# Patient Record
Sex: MALE | URBAN - NONMETROPOLITAN AREA
[De-identification: names, ages, dates, MRNs, and addresses within clinical notes are randomized per-mention and may not be internally consistent; named-entity substitution may affect disease eponyms.]

---

## 2019-03-14 ENCOUNTER — APPOINTMENT (RX ONLY)
Dept: URBAN - NONMETROPOLITAN AREA CLINIC 12 | Facility: CLINIC | Age: 39
Setting detail: DERMATOLOGY
End: 2019-03-14

## 2020-12-14 ENCOUNTER — PRE-ADMISSION TESTING (OUTPATIENT)
Dept: ADMISSIONS | Facility: MEDICAL CENTER | Age: 40
End: 2020-12-14
Attending: SURGERY
Payer: COMMERCIAL

## 2020-12-14 DIAGNOSIS — Z01.812 PRE-OPERATIVE LABORATORY EXAMINATION: ICD-10-CM

## 2020-12-14 LAB
ANION GAP SERPL CALC-SCNC: 10 MMOL/L (ref 7–16)
BUN SERPL-MCNC: 11 MG/DL (ref 8–22)
CALCIUM SERPL-MCNC: 10.4 MG/DL (ref 8.5–10.5)
CHLORIDE SERPL-SCNC: 100 MMOL/L (ref 96–112)
CO2 SERPL-SCNC: 31 MMOL/L (ref 20–33)
CREAT SERPL-MCNC: 0.93 MG/DL (ref 0.5–1.4)
ERYTHROCYTE [DISTWIDTH] IN BLOOD BY AUTOMATED COUNT: 46.8 FL (ref 35.9–50)
GLUCOSE SERPL-MCNC: 95 MG/DL (ref 65–99)
HCT VFR BLD AUTO: 49.8 % (ref 42–52)
HGB BLD-MCNC: 15.9 G/DL (ref 14–18)
MCH RBC QN AUTO: 29.2 PG (ref 27–33)
MCHC RBC AUTO-ENTMCNC: 31.9 G/DL (ref 33.7–35.3)
MCV RBC AUTO: 91.5 FL (ref 81.4–97.8)
PLATELET # BLD AUTO: 296 K/UL (ref 164–446)
PMV BLD AUTO: 9.8 FL (ref 9–12.9)
POTASSIUM SERPL-SCNC: 4.2 MMOL/L (ref 3.6–5.5)
RBC # BLD AUTO: 5.44 M/UL (ref 4.7–6.1)
SODIUM SERPL-SCNC: 141 MMOL/L (ref 135–145)
WBC # BLD AUTO: 5.6 K/UL (ref 4.8–10.8)

## 2020-12-14 PROCEDURE — 85027 COMPLETE CBC AUTOMATED: CPT

## 2020-12-14 PROCEDURE — 36415 COLL VENOUS BLD VENIPUNCTURE: CPT

## 2020-12-14 PROCEDURE — 80048 BASIC METABOLIC PNL TOTAL CA: CPT

## 2020-12-15 ENCOUNTER — ANESTHESIA (OUTPATIENT)
Dept: SURGERY | Facility: MEDICAL CENTER | Age: 40
End: 2020-12-15
Payer: COMMERCIAL

## 2020-12-15 ENCOUNTER — ANESTHESIA EVENT (OUTPATIENT)
Dept: SURGERY | Facility: MEDICAL CENTER | Age: 40
End: 2020-12-15
Payer: COMMERCIAL

## 2020-12-15 ENCOUNTER — HOSPITAL ENCOUNTER (OUTPATIENT)
Facility: MEDICAL CENTER | Age: 40
End: 2020-12-16
Attending: SURGERY | Admitting: SURGERY
Payer: COMMERCIAL

## 2020-12-15 DIAGNOSIS — G89.18 POSTOPERATIVE PAIN: ICD-10-CM

## 2020-12-15 PROCEDURE — 700105 HCHG RX REV CODE 258: Performed by: SURGERY

## 2020-12-15 PROCEDURE — 160041 HCHG SURGERY MINUTES - EA ADDL 1 MIN LEVEL 4: Performed by: SURGERY

## 2020-12-15 PROCEDURE — 700111 HCHG RX REV CODE 636 W/ 250 OVERRIDE (IP): Performed by: ANESTHESIOLOGY

## 2020-12-15 PROCEDURE — 501577 HCHG TROCAR, STEP 11MM: Performed by: SURGERY

## 2020-12-15 PROCEDURE — 96374 THER/PROPH/DIAG INJ IV PUSH: CPT

## 2020-12-15 PROCEDURE — C1781 MESH (IMPLANTABLE): HCPCS | Performed by: SURGERY

## 2020-12-15 PROCEDURE — 160002 HCHG RECOVERY MINUTES (STAT): Performed by: SURGERY

## 2020-12-15 PROCEDURE — 700101 HCHG RX REV CODE 250: Performed by: SURGERY

## 2020-12-15 PROCEDURE — G0378 HOSPITAL OBSERVATION PER HR: HCPCS

## 2020-12-15 PROCEDURE — 501570 HCHG TROCAR, SEPARATOR: Performed by: SURGERY

## 2020-12-15 PROCEDURE — 160029 HCHG SURGERY MINUTES - 1ST 30 MINS LEVEL 4: Performed by: SURGERY

## 2020-12-15 PROCEDURE — 700101 HCHG RX REV CODE 250: Performed by: ANESTHESIOLOGY

## 2020-12-15 PROCEDURE — 160009 HCHG ANES TIME/MIN: Performed by: SURGERY

## 2020-12-15 PROCEDURE — 501583 HCHG TROCAR, THRD CAN&SEAL 5X100: Performed by: SURGERY

## 2020-12-15 PROCEDURE — 502240 HCHG MISC OR SUPPLY RC 0272: Performed by: SURGERY

## 2020-12-15 PROCEDURE — A9270 NON-COVERED ITEM OR SERVICE: HCPCS | Performed by: SURGERY

## 2020-12-15 PROCEDURE — 500522 HCHG ENDOSTITCH SUTURING DEVICE: Performed by: SURGERY

## 2020-12-15 PROCEDURE — 160048 HCHG OR STATISTICAL LEVEL 1-5: Performed by: SURGERY

## 2020-12-15 PROCEDURE — 500868 HCHG NEEDLE, SURGI(VARES): Performed by: SURGERY

## 2020-12-15 PROCEDURE — 501399 HCHG SPECIMAN BAG, ENDO CATC: Performed by: SURGERY

## 2020-12-15 PROCEDURE — 502571 HCHG PACK, LAP CHOLE: Performed by: SURGERY

## 2020-12-15 PROCEDURE — 501838 HCHG SUTURE GENERAL: Performed by: SURGERY

## 2020-12-15 PROCEDURE — 160036 HCHG PACU - EA ADDL 30 MINS PHASE I: Performed by: SURGERY

## 2020-12-15 PROCEDURE — 160035 HCHG PACU - 1ST 60 MINS PHASE I: Performed by: SURGERY

## 2020-12-15 PROCEDURE — 700111 HCHG RX REV CODE 636 W/ 250 OVERRIDE (IP): Performed by: PHYSICIAN ASSISTANT

## 2020-12-15 PROCEDURE — 500521 HCHG ENDOSTITCH LOAD UNIT: Performed by: SURGERY

## 2020-12-15 PROCEDURE — 501664 HCHG TUBING, FILTER STRYKER: Performed by: SURGERY

## 2020-12-15 DEVICE — MESH SURGICAL PHASIX SEPRA 7X10CM: Type: IMPLANTABLE DEVICE | Site: ABDOMEN | Status: FUNCTIONAL

## 2020-12-15 RX ORDER — LABETALOL HYDROCHLORIDE 5 MG/ML
10 INJECTION, SOLUTION INTRAVENOUS EVERY 6 HOURS PRN
Status: DISCONTINUED | OUTPATIENT
Start: 2020-12-15 | End: 2020-12-16 | Stop reason: HOSPADM

## 2020-12-15 RX ORDER — KETAMINE HYDROCHLORIDE 50 MG/ML
INJECTION, SOLUTION INTRAMUSCULAR; INTRAVENOUS PRN
Status: DISCONTINUED | OUTPATIENT
Start: 2020-12-15 | End: 2020-12-15 | Stop reason: SURG

## 2020-12-15 RX ORDER — FAMOTIDINE 20 MG/1
20 TABLET, FILM COATED ORAL 2 TIMES DAILY
Status: DISCONTINUED | OUTPATIENT
Start: 2020-12-15 | End: 2020-12-16 | Stop reason: HOSPADM

## 2020-12-15 RX ORDER — DIPHENHYDRAMINE HYDROCHLORIDE 50 MG/ML
12.5 INJECTION INTRAMUSCULAR; INTRAVENOUS
Status: DISCONTINUED | OUTPATIENT
Start: 2020-12-15 | End: 2020-12-15 | Stop reason: HOSPADM

## 2020-12-15 RX ORDER — ROCURONIUM BROMIDE 10 MG/ML
INJECTION, SOLUTION INTRAVENOUS PRN
Status: DISCONTINUED | OUTPATIENT
Start: 2020-12-15 | End: 2020-12-15 | Stop reason: SURG

## 2020-12-15 RX ORDER — MEPERIDINE HYDROCHLORIDE 25 MG/ML
6.25 INJECTION INTRAMUSCULAR; INTRAVENOUS; SUBCUTANEOUS
Status: DISCONTINUED | OUTPATIENT
Start: 2020-12-15 | End: 2020-12-15 | Stop reason: HOSPADM

## 2020-12-15 RX ORDER — SODIUM CHLORIDE, SODIUM LACTATE, POTASSIUM CHLORIDE, CALCIUM CHLORIDE 600; 310; 30; 20 MG/100ML; MG/100ML; MG/100ML; MG/100ML
INJECTION, SOLUTION INTRAVENOUS CONTINUOUS
Status: DISCONTINUED | OUTPATIENT
Start: 2020-12-15 | End: 2020-12-15 | Stop reason: HOSPADM

## 2020-12-15 RX ORDER — ESMOLOL HYDROCHLORIDE 10 MG/ML
INJECTION INTRAVENOUS PRN
Status: DISCONTINUED | OUTPATIENT
Start: 2020-12-15 | End: 2020-12-15 | Stop reason: SURG

## 2020-12-15 RX ORDER — LIDOCAINE HYDROCHLORIDE 20 MG/ML
INJECTION, SOLUTION EPIDURAL; INFILTRATION; INTRACAUDAL; PERINEURAL PRN
Status: DISCONTINUED | OUTPATIENT
Start: 2020-12-15 | End: 2020-12-15 | Stop reason: SURG

## 2020-12-15 RX ORDER — HYDROMORPHONE HYDROCHLORIDE 1 MG/ML
0.1 INJECTION, SOLUTION INTRAMUSCULAR; INTRAVENOUS; SUBCUTANEOUS
Status: DISCONTINUED | OUTPATIENT
Start: 2020-12-15 | End: 2020-12-15 | Stop reason: HOSPADM

## 2020-12-15 RX ORDER — DIPHENHYDRAMINE HYDROCHLORIDE 50 MG/ML
25 INJECTION INTRAMUSCULAR; INTRAVENOUS EVERY 6 HOURS PRN
Status: DISCONTINUED | OUTPATIENT
Start: 2020-12-15 | End: 2020-12-16 | Stop reason: HOSPADM

## 2020-12-15 RX ORDER — MORPHINE SULFATE 4 MG/ML
1-5 INJECTION, SOLUTION INTRAMUSCULAR; INTRAVENOUS
Status: DISCONTINUED | OUTPATIENT
Start: 2020-12-15 | End: 2020-12-16 | Stop reason: HOSPADM

## 2020-12-15 RX ORDER — ONDANSETRON 2 MG/ML
4 INJECTION INTRAMUSCULAR; INTRAVENOUS
Status: DISCONTINUED | OUTPATIENT
Start: 2020-12-15 | End: 2020-12-15 | Stop reason: HOSPADM

## 2020-12-15 RX ORDER — DEXAMETHASONE SODIUM PHOSPHATE 4 MG/ML
INJECTION, SOLUTION INTRA-ARTICULAR; INTRALESIONAL; INTRAMUSCULAR; INTRAVENOUS; SOFT TISSUE PRN
Status: DISCONTINUED | OUTPATIENT
Start: 2020-12-15 | End: 2020-12-15 | Stop reason: SURG

## 2020-12-15 RX ORDER — SODIUM CHLORIDE, SODIUM LACTATE, POTASSIUM CHLORIDE, CALCIUM CHLORIDE 600; 310; 30; 20 MG/100ML; MG/100ML; MG/100ML; MG/100ML
INJECTION, SOLUTION INTRAVENOUS CONTINUOUS
Status: ACTIVE | OUTPATIENT
Start: 2020-12-15 | End: 2020-12-16

## 2020-12-15 RX ORDER — HALOPERIDOL 5 MG/ML
1 INJECTION INTRAMUSCULAR
Status: DISCONTINUED | OUTPATIENT
Start: 2020-12-15 | End: 2020-12-15 | Stop reason: HOSPADM

## 2020-12-15 RX ORDER — LABETALOL HYDROCHLORIDE 5 MG/ML
5 INJECTION, SOLUTION INTRAVENOUS
Status: DISCONTINUED | OUTPATIENT
Start: 2020-12-15 | End: 2020-12-15 | Stop reason: HOSPADM

## 2020-12-15 RX ORDER — ONDANSETRON 2 MG/ML
4 INJECTION INTRAMUSCULAR; INTRAVENOUS EVERY 4 HOURS PRN
Status: DISCONTINUED | OUTPATIENT
Start: 2020-12-15 | End: 2020-12-16 | Stop reason: HOSPADM

## 2020-12-15 RX ORDER — ONDANSETRON 4 MG/1
4 TABLET, ORALLY DISINTEGRATING ORAL EVERY 4 HOURS PRN
Status: DISCONTINUED | OUTPATIENT
Start: 2020-12-15 | End: 2020-12-16 | Stop reason: HOSPADM

## 2020-12-15 RX ORDER — HYDROCODONE BITARTRATE AND ACETAMINOPHEN 2.5; 108 MG/5ML; MG/5ML
10-20 SOLUTION ORAL EVERY 6 HOURS PRN
Qty: 200 ML | Refills: 0 | Status: SHIPPED | OUTPATIENT
Start: 2020-12-15 | End: 2020-12-19

## 2020-12-15 RX ORDER — CEFAZOLIN SODIUM 1 G/3ML
INJECTION, POWDER, FOR SOLUTION INTRAMUSCULAR; INTRAVENOUS PRN
Status: DISCONTINUED | OUTPATIENT
Start: 2020-12-15 | End: 2020-12-15 | Stop reason: SURG

## 2020-12-15 RX ORDER — BUPIVACAINE HYDROCHLORIDE AND EPINEPHRINE 5; 5 MG/ML; UG/ML
INJECTION, SOLUTION EPIDURAL; INTRACAUDAL; PERINEURAL
Status: DISCONTINUED | OUTPATIENT
Start: 2020-12-15 | End: 2020-12-15 | Stop reason: HOSPADM

## 2020-12-15 RX ORDER — LORAZEPAM 2 MG/ML
.5-1 INJECTION INTRAMUSCULAR EVERY 4 HOURS PRN
Status: DISCONTINUED | OUTPATIENT
Start: 2020-12-15 | End: 2020-12-16 | Stop reason: HOSPADM

## 2020-12-15 RX ORDER — ONDANSETRON 2 MG/ML
INJECTION INTRAMUSCULAR; INTRAVENOUS PRN
Status: DISCONTINUED | OUTPATIENT
Start: 2020-12-15 | End: 2020-12-15 | Stop reason: SURG

## 2020-12-15 RX ORDER — HYDROMORPHONE HYDROCHLORIDE 1 MG/ML
0.2 INJECTION, SOLUTION INTRAMUSCULAR; INTRAVENOUS; SUBCUTANEOUS
Status: DISCONTINUED | OUTPATIENT
Start: 2020-12-15 | End: 2020-12-15 | Stop reason: HOSPADM

## 2020-12-15 RX ORDER — HYDROMORPHONE HYDROCHLORIDE 1 MG/ML
0.4 INJECTION, SOLUTION INTRAMUSCULAR; INTRAVENOUS; SUBCUTANEOUS
Status: DISCONTINUED | OUTPATIENT
Start: 2020-12-15 | End: 2020-12-15 | Stop reason: HOSPADM

## 2020-12-15 RX ADMIN — PROPOFOL 200 MG: 10 INJECTION, EMULSION INTRAVENOUS at 14:34

## 2020-12-15 RX ADMIN — SODIUM CHLORIDE, POTASSIUM CHLORIDE, SODIUM LACTATE AND CALCIUM CHLORIDE: 600; 310; 30; 20 INJECTION, SOLUTION INTRAVENOUS at 13:18

## 2020-12-15 RX ADMIN — DEXAMETHASONE SODIUM PHOSPHATE 8 MG: 4 INJECTION, SOLUTION INTRA-ARTICULAR; INTRALESIONAL; INTRAMUSCULAR; INTRAVENOUS; SOFT TISSUE at 15:12

## 2020-12-15 RX ADMIN — ESMOLOL HYDROCHLORIDE 20 MG: 100 INJECTION, SOLUTION INTRAVENOUS at 15:00

## 2020-12-15 RX ADMIN — FENTANYL CITRATE 50 MCG: 50 INJECTION, SOLUTION INTRAMUSCULAR; INTRAVENOUS at 14:34

## 2020-12-15 RX ADMIN — POVIDONE IODINE 15 ML: 100 SOLUTION TOPICAL at 13:18

## 2020-12-15 RX ADMIN — KETAMINE HYDROCHLORIDE 30 MG: 50 INJECTION INTRAMUSCULAR; INTRAVENOUS at 14:41

## 2020-12-15 RX ADMIN — FAMOTIDINE 20 MG: 10 INJECTION INTRAVENOUS at 20:28

## 2020-12-15 RX ADMIN — ONDANSETRON 4 MG: 2 INJECTION INTRAMUSCULAR; INTRAVENOUS at 15:48

## 2020-12-15 RX ADMIN — LIDOCAINE HYDROCHLORIDE 100 MG: 20 INJECTION, SOLUTION EPIDURAL; INFILTRATION; INTRACAUDAL at 14:34

## 2020-12-15 RX ADMIN — ROCURONIUM BROMIDE 30 MG: 10 INJECTION, SOLUTION INTRAVENOUS at 15:07

## 2020-12-15 RX ADMIN — ROCURONIUM BROMIDE 50 MG: 10 INJECTION, SOLUTION INTRAVENOUS at 14:34

## 2020-12-15 RX ADMIN — FENTANYL CITRATE 50 MCG: 50 INJECTION, SOLUTION INTRAMUSCULAR; INTRAVENOUS at 16:05

## 2020-12-15 RX ADMIN — SUGAMMADEX 200 MG: 100 INJECTION, SOLUTION INTRAVENOUS at 16:00

## 2020-12-15 RX ADMIN — CEFAZOLIN 2 G: 330 INJECTION, POWDER, FOR SOLUTION INTRAMUSCULAR; INTRAVENOUS at 14:34

## 2020-12-15 RX ADMIN — FENTANYL CITRATE 50 MCG: 50 INJECTION, SOLUTION INTRAMUSCULAR; INTRAVENOUS at 14:49

## 2020-12-15 RX ADMIN — FENTANYL CITRATE 50 MCG: 50 INJECTION, SOLUTION INTRAMUSCULAR; INTRAVENOUS at 15:48

## 2020-12-15 ASSESSMENT — COGNITIVE AND FUNCTIONAL STATUS - GENERAL
MOVING TO AND FROM BED TO CHAIR: A LITTLE
DAILY ACTIVITIY SCORE: 20
EATING MEALS: A LITTLE
STANDING UP FROM CHAIR USING ARMS: A LITTLE
HELP NEEDED FOR BATHING: A LITTLE
MOVING FROM LYING ON BACK TO SITTING ON SIDE OF FLAT BED: A LITTLE
TURNING FROM BACK TO SIDE WHILE IN FLAT BAD: A LITTLE
DRESSING REGULAR LOWER BODY CLOTHING: A LITTLE
CLIMB 3 TO 5 STEPS WITH RAILING: A LITTLE
WALKING IN HOSPITAL ROOM: A LITTLE
SUGGESTED CMS G CODE MODIFIER DAILY ACTIVITY: CJ
DRESSING REGULAR UPPER BODY CLOTHING: A LITTLE
MOBILITY SCORE: 18
SUGGESTED CMS G CODE MODIFIER MOBILITY: CK

## 2020-12-15 ASSESSMENT — PATIENT HEALTH QUESTIONNAIRE - PHQ9
2. FEELING DOWN, DEPRESSED, IRRITABLE, OR HOPELESS: NOT AT ALL
1. LITTLE INTEREST OR PLEASURE IN DOING THINGS: NOT AT ALL
SUM OF ALL RESPONSES TO PHQ9 QUESTIONS 1 AND 2: 0

## 2020-12-15 ASSESSMENT — LIFESTYLE VARIABLES
ALCOHOL_USE: NO
TOTAL SCORE: 0
EVER HAD A DRINK FIRST THING IN THE MORNING TO STEADY YOUR NERVES TO GET RID OF A HANGOVER: NO
TOTAL SCORE: 0
ON A TYPICAL DAY WHEN YOU DRINK ALCOHOL HOW MANY DRINKS DO YOU HAVE: 0
DOES PATIENT WANT TO STOP DRINKING: NO
EVER FELT BAD OR GUILTY ABOUT YOUR DRINKING: NO
HAVE YOU EVER FELT YOU SHOULD CUT DOWN ON YOUR DRINKING: NO
HOW MANY TIMES IN THE PAST YEAR HAVE YOU HAD 5 OR MORE DRINKS IN A DAY: 0
HAVE PEOPLE ANNOYED YOU BY CRITICIZING YOUR DRINKING: NO
AVERAGE NUMBER OF DAYS PER WEEK YOU HAVE A DRINK CONTAINING ALCOHOL: 0
TOTAL SCORE: 0
CONSUMPTION TOTAL: NEGATIVE

## 2020-12-15 ASSESSMENT — PAIN SCALES - GENERAL: PAIN_LEVEL: 3

## 2020-12-15 ASSESSMENT — PAIN DESCRIPTION - PAIN TYPE: TYPE: SURGICAL PAIN

## 2020-12-15 NOTE — OR NURSING
Pre-op assessment complete, VSS stable, updated on POC. Call light within reach. Denies needs at this time.

## 2020-12-15 NOTE — ANESTHESIA PREPROCEDURE EVALUATION
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 On eliquis, last dose 3 days ago.    Relevant Problems   No relevant active problems       Physical Exam    Airway   Mallampati: II  TM distance: >3 FB  Neck ROM: full       Cardiovascular - normal exam  Rhythm: regular  Rate: normal  (-) murmur     Dental - normal exam           Pulmonary - normal exam  Breath sounds clear to auscultation     Abdominal    Neurological - normal exam                 Anesthesia Plan    ASA 3 (Recurrent DVT/PE)   ASA physical status 3 criteria: other (comment)    Plan - general       Airway plan will be ETT        Induction: intravenous    Postoperative Plan: Postoperative administration of opioids is intended.    Pertinent diagnostic labs and testing reviewed    Informed Consent:    Anesthetic plan and risks discussed with patient.    Use of blood products discussed with: patient whom consented to blood products.

## 2020-12-15 NOTE — ANESTHESIA PROCEDURE NOTES
Airway    Date/Time: 12/15/2020 2:36 PM  Performed by: Brijesh Cordon M.D.  Authorized by: Brijesh Cordon M.D.     Location:  OR  Urgency:  Elective  Indications for Airway Management:  Anesthesia      Spontaneous Ventilation: absent    Sedation Level:  Deep  Preoxygenated: Yes    Patient Position:  Sniffing  Mask Difficulty Assessment:  1 - vent by mask  Final Airway Type:  Endotracheal airway  Final Endotracheal Airway:  ETT  Cuffed: Yes    Technique Used for Successful ETT Placement:  Direct laryngoscopy    Insertion Site:  Oral  Blade Type:  Maribel  Laryngoscope Blade/Videolaryngoscope Blade Size:  3  ETT Size (mm):  7.5  Measured from:  Lips  Placement Verified by: auscultation and capnometry    Cormack-Lehane Classification:  Grade I - full view of glottis  Number of Attempts at Approach:  1  Number of Other Approaches Attempted:  0

## 2020-12-16 VITALS
OXYGEN SATURATION: 98 % | BODY MASS INDEX: 30.31 KG/M2 | SYSTOLIC BLOOD PRESSURE: 115 MMHG | HEIGHT: 68 IN | TEMPERATURE: 97.8 F | WEIGHT: 200 LBS | HEART RATE: 79 BPM | DIASTOLIC BLOOD PRESSURE: 71 MMHG | RESPIRATION RATE: 18 BRPM

## 2020-12-16 PROCEDURE — A9270 NON-COVERED ITEM OR SERVICE: HCPCS | Performed by: PHYSICIAN ASSISTANT

## 2020-12-16 PROCEDURE — 96372 THER/PROPH/DIAG INJ SC/IM: CPT

## 2020-12-16 PROCEDURE — 700111 HCHG RX REV CODE 636 W/ 250 OVERRIDE (IP): Performed by: SURGERY

## 2020-12-16 PROCEDURE — 90471 IMMUNIZATION ADMIN: CPT

## 2020-12-16 PROCEDURE — G0378 HOSPITAL OBSERVATION PER HR: HCPCS

## 2020-12-16 PROCEDURE — 700111 HCHG RX REV CODE 636 W/ 250 OVERRIDE (IP): Performed by: PHYSICIAN ASSISTANT

## 2020-12-16 PROCEDURE — 90686 IIV4 VACC NO PRSV 0.5 ML IM: CPT | Performed by: SURGERY

## 2020-12-16 PROCEDURE — 700102 HCHG RX REV CODE 250 W/ 637 OVERRIDE(OP): Performed by: PHYSICIAN ASSISTANT

## 2020-12-16 RX ADMIN — INFLUENZA A VIRUS A/GUANGDONG-MAONAN/SWL1536/2019 CNIC-1909 (H1N1) ANTIGEN (FORMALDEHYDE INACTIVATED), INFLUENZA A VIRUS A/HONG KONG/2671/2019 (H3N2) ANTIGEN (FORMALDEHYDE INACTIVATED), INFLUENZA B VIRUS B/PHUKET/3073/2013 ANTIGEN (FORMALDEHYDE INACTIVATED), AND INFLUENZA B VIRUS B/WASHINGTON/02/2019 ANTIGEN (FORMALDEHYDE INACTIVATED) 0.5 ML: 15; 15; 15; 15 INJECTION, SUSPENSION INTRAMUSCULAR at 08:17

## 2020-12-16 RX ADMIN — FAMOTIDINE 20 MG: 20 TABLET ORAL at 05:10

## 2020-12-16 RX ADMIN — ENOXAPARIN SODIUM 40 MG: 40 INJECTION SUBCUTANEOUS at 08:17

## 2020-12-16 ASSESSMENT — PAIN DESCRIPTION - PAIN TYPE: TYPE: SURGICAL PAIN

## 2020-12-16 NOTE — ANESTHESIA TIME REPORT
Anesthesia Start and Stop Event Times     Date Time Event    12/15/2020 1420 Ready for Procedure     1429 Anesthesia Start     1616 Anesthesia Stop        Responsible Staff  12/15/20    Name Role Begin End    Brijesh Cordon M.D. Anesth 1429 1616        Preop Diagnosis (Free Text):  Pre-op Diagnosis     PARAESOPHAGEAL HIATAL HERNIA        Preop Diagnosis (Codes):    Post op Diagnosis  Paraesophageal hiatal hernia      Premium Reason  A. 3PM - 7AM    Comments:

## 2020-12-16 NOTE — DISCHARGE INSTRUCTIONS
Discharge Instructions    Discharged to home by car with relative. Discharged via wheelchair, hospital escort: Yes.  Special equipment needed: Not Applicable    Be sure to schedule a follow-up appointment with your primary care doctor or any specialists as instructed.     Discharge Plan:   Diet Plan: Discussed  Activity Level: Discussed  Confirmed Follow up Appointment: Patient to Call and Schedule Appointment  Confirmed Symptoms Management: Discussed  Medication Reconciliation Updated: Yes  Influenza Vaccine Indication: Indicated: 9 to 64 years of age  Influenza Vaccine Given - only chart on this line when given: Influenza Vaccine Given (See MAR)    I understand that a diet low in cholesterol, fat, and sodium is recommended for good health. Unless I have been given specific instructions below for another diet, I accept this instruction as my diet prescription.   Other diet: Regular     Special Instructions:   Clears without carbonation today. Please ensure patient able to tolerate several ounces (small sips at a time) of water/clears without dysphagia or vomiting/regurgitation before sending home.  Advance diet as tolerated to full liquids tomorrow. Follow diet progression handout given at preop appt.  Up ad angela.  Ok to Shower over Tegaderms ; remove Tegaderms on 12/18/20.  No baths or soaks x 14 days.  No driving x 4-5 days.  No lifting > 15 lbs until x 4-6 weeks.  D/C home when alert, comfortable, ambulatory, and tolerating PO well  Pt Counseled re: I.S., diet, activity, home med's, and wound care  All home med's larger in size than eraser head should be crushed or changed to liquid form x 2-3 weeks, while on liquid diet.  Hold MVI/supplements until after postop check.  F/U with Dr. Ganser ~ 1-2 weeks.    · Is patient discharged on Warfarin / Coumadin?   No     Depression / Suicide Risk    As you are discharged from this [facility] facility, it is important to learn how to keep safe from harming  yourself.    Recognize the warning signs:  · Abrupt changes in personality, positive or negative- including increase in energy   · Giving away possessions  · Change in eating patterns- significant weight changes-  positive or negative  · Change in sleeping patterns- unable to sleep or sleeping all the time   · Unwillingness or inability to communicate  · Depression  · Unusual sadness, discouragement and loneliness  · Talk of wanting to die  · Neglect of personal appearance   · Rebelliousness- reckless behavior  · Withdrawal from people/activities they love  · Confusion- inability to concentrate     If you or a loved one observes any of these behaviors or has concerns about self-harm, here's what you can do:  · Talk about it- your feelings and reasons for harming yourself  · Remove any means that you might use to hurt yourself (examples: pills, rope, extension cords, firearm)  · Get professional help from the community (Mental Health, Substance Abuse, psychological counseling)  · Do not be alone:Call your Safe Contact- someone whom you trust who will be there for you.  · Call your local CRISIS HOTLINE 223-6034 or 103-835-6095  · Call your local Children's Mobile Crisis Response Team [place] (242) 526-2747 or www.Rent.com  · Call the toll free National Suicide Prevention Hotlines   · National Suicide Prevention Lifeline 669-200-CCLV (2136)  · National Hope Line Network 800-SUICIDE (930-1977)      Laparoscopic Nissen Fundoplication, Care After  This sheet gives you information about how to care for yourself after your procedure. Your health care provider may also give you more specific instructions. If you have problems or questions, contact your health care provider.  What can I expect after the procedure?  After the procedure, it is common to have:  · Trouble swallowing (dysphagia).  · Discomfort when you swallow.  · Abdominal soreness.  · Bloating.  Follow these instructions at home:  Medicines  · Take  over-the-counter and prescription medicines only as told by your health care provider.  · Ask your health care provider or pharmacist if you can crush any pill that you are taking. Take only liquid medicines as directed.  · Do not drive or use heavy machinery while taking prescription pain medicine.  Incision care    · Follow instructions from your health care provider about how to take care of your incisions. Make sure you:  ? Wash your hands with soap and water before you change your bandage (dressing). If soap and water are not available, use hand .  ? Change your dressing as told by your health care provider.  ? Leave stitches (sutures), skin glue, or adhesive strips in place. These skin closures may need to stay in place for 2 weeks or longer. If adhesive strip edges start to loosen and curl up, you may trim the loose edges. Do not remove adhesive strips completely unless your health care provider tells you to do that.  · Check your incision area every day for signs of infection. Check for:  ? Redness, swelling, or pain.  ? Fluid or blood.  ? Warmth.  ? Pus or a bad smell.  Eating and drinking    · Follow instructions from your health care provider about eating or drinking restrictions. Follow these instructions carefully.  ? You may need to follow a liquid-only diet for 2 weeks, followed by a diet of soft foods for 2 weeks.  ? You should eat slow, take small bites, and chew the food carefully.  ? You should eat or drink in an upright position.  ? You should return to your usual diet gradually.  · Drink enough fluid to keep your urine pale yellow.  Activity    · Rest as told by your health care provider.  · Avoid sitting for a long time without moving. Get up to take short walks every 1-2 hours. This is important to improve blood flow and breathing.  · Do not lift anything that is heavier than 10 lb (4.5 kg), or the limit that you are told, until your health care provider says that it is safe.  · Avoid  activities that take a lot of effort.  · Ask your health care provider what activities are safe for you. Ask when you can:  ? Return to sexual activity.  ? Drive.  ? Go back to work.  · Return to your normal activities as told by your health care provider.  Bathing  · Do not take baths, swim, or use a hot tub until your health care provider approves. Ask your health care provider if you may take showers. You may only be allowed to take sponge baths.  General instructions  · Do not use any products that contain nicotine or tobacco, such as cigarettes and e-cigarettes. These can delay healing after surgery. If you need help quitting, ask your health care provider.  · Keep all follow-up visits as told by your health care provider. This is important.  Contact a health care provider if you have:  · A fever.  · Pain that does not go away with medicine.  · Frequent nausea or vomiting.  · Painful bloating.  · Constipation.  · Trouble swallowing.  · A cough that does not go away.  · An incision that opens up.  · An incision area that feels warm to touch.  · Redness, swelling, or pain in any incision areas.  · Fluid, blood, or pus coming from any incision.  Get help right away if:  · You have severe pain or severe bloating.  · You are unable to swallow.  · You have vomiting that does not stop.  · You have blood in your vomit.  · You have trouble breathing.  Summary  · After the procedure, it is common to have trouble swallowing or have discomfort when you swallow.  · Follow instructions from your health care provider about eating or drinking restrictions. You may need to follow a liquid-only diet for 2 weeks, followed by a diet of soft foods for 2 weeks.  · Return to your normal activities as told by your health care provider.  · Keep all follow-up visits as told by your health care provider. This is important.  This information is not intended to replace advice given to you by your health care provider. Make sure you discuss  any questions you have with your health care provider.  Document Released: 08/10/2005 Document Revised: 09/06/2019 Document Reviewed: 01/16/2019  Elsevier Patient Education © 2020 [employer]

## 2020-12-16 NOTE — PROGRESS NOTES
Report received from RN, assumed care at 0700  Pt is A0X4, and responds appropriately   Pt declines any SOB, chest pain, new onset of numbness/ tingiling  Pt rates pain at 0/10, on a scale of 1-10, pt declines pain and pain medication needs at this time   Pt is voiding adequatly and without hesitancy  Pt has + flatus, + bowel sounds, - BM   Pt ambulates with a steady gait up self   Pt is tolerating a clear liquid diet with no carbonation , pt denies any nausea/vomiting  Pt has x5 lap sites to the abdomen, dressings are clean, dry and intact   Per Dr. Ganser pt okay to D/C this AM   Pt education on post op diet   Flu shot given       Plan of care discussed, all questions answered. Explained importance of calling before getting OOB and pt verbalizes understanding. Explained importance of oral care. Call light is within reach, treaded slipper socks on, bed in lowest/ locked position, hourly rounding in place, all needs met at this time

## 2020-12-16 NOTE — CARE PLAN
Problem: Infection  Goal: Will remain free from infection  Outcome: PROGRESSING AS EXPECTED  Note: Patient remains afebrile and does not show any symptoms of infections      Problem: Pain Management  Goal: Pain level will decrease to patient's comfort goal  Outcome: PROGRESSING AS EXPECTED  Note: Patient dates pain as a 2-3 on a scale of 1-10, patient communicates that he does not need medication interventions for pain. Patient able to rest comfortably in bed, patient able to sleep and ambulate comfortably.

## 2020-12-16 NOTE — ANESTHESIA POSTPROCEDURE EVALUATION
Patient: Francis Enamorado    Procedure Summary     Date: 12/15/20 Room / Location: [facility] 03 / SURGERY [facility]    Anesthesia Start: 1429 Anesthesia Stop: 1616    Procedure: FUNDOPLICATION, NISSEN, LAPAROSCOPIC-PARAESOPHAGEAL HERNIA WITH MESH (N/A Abdomen) Diagnosis: (PARAESOPHAGEAL HIATAL HERNIA)    Surgeons: John H Ganser, M.D. Responsible Provider: Brijesh Cordon M.D.    Anesthesia Type: general ASA Status: 3          Final Anesthesia Type: general  Last vitals  BP   Blood Pressure: 132/87    Temp   36.8 °C (98.2 °F)    Pulse   Pulse: 98   Resp   20    SpO2   100 %      Anesthesia Post Evaluation    Patient location during evaluation: PACU  Patient participation: complete - patient participated  Level of consciousness: awake and alert  Pain score: 3    Airway patency: patent  Anesthetic complications: no  Cardiovascular status: hemodynamically stable  Respiratory status: acceptable  Hydration status: euvolemic    PONV: none           Nurse Pain Score: 3 (NPRS)

## 2020-12-16 NOTE — PROGRESS NOTES
Pt A&Ox4  Patient uses call light appropriately and calls when needing assistance, patient communicates all needs and questions with staff.     Tolerating clear liquid diet, denies n/v. Hypoactive bowel sounds, + flatus, LBM 12/15/2020  Saturating >90% on 2L O2 Nasal Canula.     Patient previously on oxy mask per ERAAS protocol. Once off oxy mask patient's  O2 saturation dips down into the 80's. Patient states that this happens every time he is in the hospital the O2 saturation monitor goes off. Placed patient on 2L Nasal Canula     Pt ambulates with minimal standby assistance, patient ambulates to and from the bathroom with a steady gait     5 Lap sites covered with gauze and Tegaderm, lap sites are clean/dry/intact     Updated on plan of care. Safety education provided. Bed locked in low. Call light within reach. Rounding in place.

## 2020-12-16 NOTE — OR NURSING
Patient A+Ox4. Denies pain or nausea.  abd soft with lap stabs x5 with island dressings intact.  VSS.  Plan for patient to stay overnite. Cont to monitor.  Update family with patient plan

## 2020-12-16 NOTE — PROGRESS NOTES
2 RN skin check complete.     Devices in place:  SpO2 finger probe, PIV line, oxymask.   Skin assessed under devices     Surgical Incision - 5 lap sites with gauze/tegaderm, CDI.     -Skin beneath folds checked.   - All bony prominences assessed. Skin intact.     Preventative measures in place:  - turns with use of pillows to support repositioning  -bony prominences floated on pillows  -mobilization  -repositioning of devices   -Offered tooth brushing  -Waffle Mattress overlay in place  -Cue for turning while in bed        -Patient currently sitting in chair.

## 2020-12-16 NOTE — OP REPORT
Operative Report    Date: 12/15/2020    Surgeon: John Ganser M.D.    Assistant: Naif Alejandre PA-C    Anesthesia: Dr. Cordon    Pre-operative Diagnosis: Large Paraesophageal Hiatal Hernia    Post-operative Diagnosis: Same     Procedure: Laparoscopic Repair Paraesophageal Hiatal Hernia with Mesh,  Fundoplication    Indications: The patient has a long history of symptoms related to a large paraesophageal hiatal hernia. Risks, benefits, and alternatives to repair with mesh and fundoplication were outlined in detail. All questions were answered and wished to proceed.    Findings: A large hernia, repaired with Phasix ST mesh support, 270 degree fundoplication    Procedure in detail:  The patient was identified and general anesthetic   administered.  The abdomen was prepped and draped in the usual sterile   fashion.  Local anesthesia of 0.5% Marcaine with epinephrine was injected   prior to making skin incision.  Small incision was made to left midline in low   epigastric region and the Veress needle passed.  The abdomen was insufflated   with carbon dioxide without incident and a 5-mm blunt trocar and 5-mm   30-degree scope was inserted. A Lazaro liver retractor was passed through a   small subxiphoid incision and used to elevate the lateral segment of the liver   and this was held with the robotic arm.  Right upper quadrant 5, left upper   quadrant 11, left lateral subcostal 5 mm trocars were placed.  Inspection of   the hiatus showed the above findings.  Dissection was begun by dividing the   gastrohepatic ligament using the Harmonic scalpel, which was used for all of   the dissection.  The hernia sac was mobilized from the hiatus   circumferentially and teased out of the mediastinum allowing the stomach and   the hernia contents to be reduced.  Circumferential dissection was carried around   the esophagus well into the mediastinum to allow the gastroesophageal junction  to be brought well below the hiatus under no  tension upwards being careful to   avoid injury to the vagus nerves.  The hernia sac was taken off the greater curve of the stomach and discarded. A large amount of herniated retroperitoneal fat was excised and removed with an endoscopic bag.    Posterior hiatal hernia repair was carried out with 0 Surgidac using the   EndoStitch device placing 3 sutures horizontal mattress sutures using strips of the mesh as bolsters.    The remainder of the 7 X 10 cm Phasix ST mesh was soaked in saline and   notched to accommodate the esophagus and inserted in the abdomen and   placed posteriorly over the crural repair.    The short gastric vessels were taken down on the superior aspect of the fundus to   allow for fundoplication. The fundoplication was carried out rotating the fundus from left to right behind   the esophagus.  On the right and left posterolateral aspects it was secured   down to the hiatus with the EndoStitch device.  A 270-degree fundoplication   was then completed, bringing it around forward and tacking both sides down   to the esophagus with 3 sutures with the uppermost sutures incorporating the   hiatus. The bougie was removed and the wrap maintained good orientation  with no torsion or tension upwards. A posterior suture was placed securing the   inferior border of the mesh to the crural repair and incorporating the posterior   aspect of the wrap. The abdomen was irrigated and hemostasis was assured.    Liver retractor and trocars were withdrawn, the abdomen deflated, and incision   was closed with Vicryl.  Sterile dressings were applied.    The patient returned to recovery room in stable condition.    Sponge and needle counts were correct at the end of the case.     John Ganser, MD, [facility]  [phone]

## 2020-12-16 NOTE — PROGRESS NOTES
Chris admitted to room T403-1 with PACU nurse at 1844 in wheelchair.  Patient on ERAS. ERAS began 1640. Patient ambulated 250 feet and currently sitting up in chair. Reports no nausea.     Patient reports no pain. Oriented to room call light and smoking policy.  Reviewed plan of care (equipment, incentive spirometer, sequential compression devices, medications, activity, diet, fall precautions, skin care, and pain) with patient and family. Welcome packet given and reviewed with patient, all questions answered. Education provided on oral hygiene program.

## 2021-01-10 ENCOUNTER — APPOINTMENT (OUTPATIENT)
Dept: RADIOLOGY | Facility: MEDICAL CENTER | Age: 41
DRG: 388 | End: 2021-01-10
Attending: STUDENT IN AN ORGANIZED HEALTH CARE EDUCATION/TRAINING PROGRAM
Payer: COMMERCIAL

## 2021-01-10 ENCOUNTER — HOSPITAL ENCOUNTER (INPATIENT)
Facility: MEDICAL CENTER | Age: 41
LOS: 2 days | DRG: 388 | End: 2021-01-12
Attending: EMERGENCY MEDICINE | Admitting: FAMILY MEDICINE
Payer: COMMERCIAL

## 2021-01-10 ENCOUNTER — HOSPITAL ENCOUNTER (OUTPATIENT)
Dept: RADIOLOGY | Facility: MEDICAL CENTER | Age: 41
End: 2021-01-10
Payer: COMMERCIAL

## 2021-01-10 ENCOUNTER — APPOINTMENT (OUTPATIENT)
Dept: RADIOLOGY | Facility: MEDICAL CENTER | Age: 41
DRG: 388 | End: 2021-01-10
Attending: SURGERY
Payer: COMMERCIAL

## 2021-01-10 DIAGNOSIS — K85.90 ACUTE PANCREATITIS, UNSPECIFIED COMPLICATION STATUS, UNSPECIFIED PANCREATITIS TYPE: ICD-10-CM

## 2021-01-10 DIAGNOSIS — K56.609 SBO (SMALL BOWEL OBSTRUCTION) (HCC): ICD-10-CM

## 2021-01-10 PROBLEM — I82.409 DVT (DEEP VENOUS THROMBOSIS) (HCC): Status: ACTIVE | Noted: 2021-01-10

## 2021-01-10 PROBLEM — R73.9 HYPERGLYCEMIA: Status: ACTIVE | Noted: 2021-01-10

## 2021-01-10 LAB
ALBUMIN SERPL BCP-MCNC: 4.3 G/DL (ref 3.2–4.9)
ALBUMIN/GLOB SERPL: 1.7 G/DL
ALP SERPL-CCNC: 68 U/L (ref 30–99)
ALT SERPL-CCNC: 25 U/L (ref 2–50)
ANION GAP SERPL CALC-SCNC: 6 MMOL/L (ref 7–16)
AST SERPL-CCNC: 23 U/L (ref 12–45)
BASOPHILS # BLD AUTO: 0.1 % (ref 0–1.8)
BASOPHILS # BLD: 0.01 K/UL (ref 0–0.12)
BILIRUB SERPL-MCNC: 0.2 MG/DL (ref 0.1–1.5)
BUN SERPL-MCNC: 9 MG/DL (ref 8–22)
CALCIUM SERPL-MCNC: 8.7 MG/DL (ref 8.5–10.5)
CHLORIDE SERPL-SCNC: 107 MMOL/L (ref 96–112)
CHOLEST SERPL-MCNC: 170 MG/DL (ref 100–199)
CO2 SERPL-SCNC: 25 MMOL/L (ref 20–33)
CREAT SERPL-MCNC: 0.65 MG/DL (ref 0.5–1.4)
EOSINOPHIL # BLD AUTO: 0.01 K/UL (ref 0–0.51)
EOSINOPHIL NFR BLD: 0.1 % (ref 0–6.9)
ERYTHROCYTE [DISTWIDTH] IN BLOOD BY AUTOMATED COUNT: 43.2 FL (ref 35.9–50)
EST. AVERAGE GLUCOSE BLD GHB EST-MCNC: 111 MG/DL
ETHANOL BLD-MCNC: <10.1 MG/DL (ref 0–10)
GLOBULIN SER CALC-MCNC: 2.6 G/DL (ref 1.9–3.5)
GLUCOSE BLD-MCNC: 110 MG/DL (ref 65–99)
GLUCOSE BLD-MCNC: 89 MG/DL (ref 65–99)
GLUCOSE SERPL-MCNC: 127 MG/DL (ref 65–99)
HBA1C MFR BLD: 5.5 % (ref 0–5.6)
HCT VFR BLD AUTO: 49.7 % (ref 42–52)
HDLC SERPL-MCNC: 35 MG/DL
HGB BLD-MCNC: 15.5 G/DL (ref 14–18)
IMM GRANULOCYTES # BLD AUTO: 0.06 K/UL (ref 0–0.11)
IMM GRANULOCYTES NFR BLD AUTO: 0.5 % (ref 0–0.9)
LDLC SERPL CALC-MCNC: 122 MG/DL
LIPASE SERPL-CCNC: 915 U/L (ref 11–82)
LYMPHOCYTES # BLD AUTO: 0.32 K/UL (ref 1–4.8)
LYMPHOCYTES NFR BLD: 2.7 % (ref 22–41)
MAGNESIUM SERPL-MCNC: 1.9 MG/DL (ref 1.5–2.5)
MCH RBC QN AUTO: 28.3 PG (ref 27–33)
MCHC RBC AUTO-ENTMCNC: 31.2 G/DL (ref 33.7–35.3)
MCV RBC AUTO: 90.7 FL (ref 81.4–97.8)
MONOCYTES # BLD AUTO: 0.49 K/UL (ref 0–0.85)
MONOCYTES NFR BLD AUTO: 4.1 % (ref 0–13.4)
NEUTROPHILS # BLD AUTO: 11.08 K/UL (ref 1.82–7.42)
NEUTROPHILS NFR BLD: 92.5 % (ref 44–72)
NRBC # BLD AUTO: 0 K/UL
NRBC BLD-RTO: 0 /100 WBC
PLATELET # BLD AUTO: 251 K/UL (ref 164–446)
PMV BLD AUTO: 10.9 FL (ref 9–12.9)
POTASSIUM SERPL-SCNC: 4.4 MMOL/L (ref 3.6–5.5)
PROT SERPL-MCNC: 6.9 G/DL (ref 6–8.2)
RBC # BLD AUTO: 5.48 M/UL (ref 4.7–6.1)
SODIUM SERPL-SCNC: 138 MMOL/L (ref 135–145)
TRIGL SERPL-MCNC: 66 MG/DL (ref 0–149)
WBC # BLD AUTO: 12 K/UL (ref 4.8–10.8)

## 2021-01-10 PROCEDURE — 700102 HCHG RX REV CODE 250 W/ 637 OVERRIDE(OP): Performed by: STUDENT IN AN ORGANIZED HEALTH CARE EDUCATION/TRAINING PROGRAM

## 2021-01-10 PROCEDURE — 700111 HCHG RX REV CODE 636 W/ 250 OVERRIDE (IP): Performed by: STUDENT IN AN ORGANIZED HEALTH CARE EDUCATION/TRAINING PROGRAM

## 2021-01-10 PROCEDURE — 770006 HCHG ROOM/CARE - MED/SURG/GYN SEMI*

## 2021-01-10 PROCEDURE — 82077 ASSAY SPEC XCP UR&BREATH IA: CPT

## 2021-01-10 PROCEDURE — 76705 ECHO EXAM OF ABDOMEN: CPT

## 2021-01-10 PROCEDURE — 80061 LIPID PANEL: CPT

## 2021-01-10 PROCEDURE — 99285 EMERGENCY DEPT VISIT HI MDM: CPT

## 2021-01-10 PROCEDURE — 700105 HCHG RX REV CODE 258: Performed by: STUDENT IN AN ORGANIZED HEALTH CARE EDUCATION/TRAINING PROGRAM

## 2021-01-10 PROCEDURE — 83036 HEMOGLOBIN GLYCOSYLATED A1C: CPT

## 2021-01-10 PROCEDURE — 99220 PR INITIAL OBSERVATION CARE,LEVL III: CPT | Performed by: STUDENT IN AN ORGANIZED HEALTH CARE EDUCATION/TRAINING PROGRAM

## 2021-01-10 PROCEDURE — 82962 GLUCOSE BLOOD TEST: CPT

## 2021-01-10 PROCEDURE — 83735 ASSAY OF MAGNESIUM: CPT

## 2021-01-10 PROCEDURE — 80053 COMPREHEN METABOLIC PANEL: CPT

## 2021-01-10 PROCEDURE — 85025 COMPLETE CBC W/AUTO DIFF WBC: CPT

## 2021-01-10 PROCEDURE — 83690 ASSAY OF LIPASE: CPT

## 2021-01-10 RX ORDER — DEXTROSE MONOHYDRATE 25 G/50ML
50 INJECTION, SOLUTION INTRAVENOUS
Status: DISCONTINUED | OUTPATIENT
Start: 2021-01-10 | End: 2021-01-11

## 2021-01-10 RX ORDER — SODIUM CHLORIDE 9 MG/ML
INJECTION, SOLUTION INTRAVENOUS CONTINUOUS
Status: DISCONTINUED | OUTPATIENT
Start: 2021-01-10 | End: 2021-01-11

## 2021-01-10 RX ORDER — POTASSIUM CHLORIDE 7.45 MG/ML
10 INJECTION INTRAVENOUS
Status: DISPENSED | OUTPATIENT
Start: 2021-01-10 | End: 2021-01-10

## 2021-01-10 RX ORDER — ONDANSETRON 2 MG/ML
4 INJECTION INTRAMUSCULAR; INTRAVENOUS EVERY 4 HOURS PRN
Status: DISCONTINUED | OUTPATIENT
Start: 2021-01-10 | End: 2021-01-12 | Stop reason: HOSPADM

## 2021-01-10 RX ADMIN — ENOXAPARIN SODIUM 100 MG: 100 INJECTION SUBCUTANEOUS at 17:19

## 2021-01-10 RX ADMIN — ENOXAPARIN SODIUM 100 MG: 100 INJECTION SUBCUTANEOUS at 05:27

## 2021-01-10 RX ADMIN — POTASSIUM CHLORIDE 10 MEQ: 7.46 INJECTION, SOLUTION INTRAVENOUS at 05:36

## 2021-01-10 RX ADMIN — SODIUM CHLORIDE: 9 INJECTION, SOLUTION INTRAVENOUS at 05:36

## 2021-01-10 RX ADMIN — POTASSIUM CHLORIDE 10 MEQ: 7.46 INJECTION, SOLUTION INTRAVENOUS at 07:04

## 2021-01-10 ASSESSMENT — COGNITIVE AND FUNCTIONAL STATUS - GENERAL
CLIMB 3 TO 5 STEPS WITH RAILING: A LITTLE
STANDING UP FROM CHAIR USING ARMS: A LITTLE
TOILETING: A LITTLE
DRESSING REGULAR LOWER BODY CLOTHING: A LITTLE
SUGGESTED CMS G CODE MODIFIER DAILY ACTIVITY: CK
DAILY ACTIVITIY SCORE: 18
DRESSING REGULAR UPPER BODY CLOTHING: A LITTLE
MOBILITY SCORE: 18
WALKING IN HOSPITAL ROOM: A LITTLE
MOVING FROM LYING ON BACK TO SITTING ON SIDE OF FLAT BED: A LITTLE
HELP NEEDED FOR BATHING: A LITTLE
TURNING FROM BACK TO SIDE WHILE IN FLAT BAD: A LITTLE
PERSONAL GROOMING: A LITTLE
MOVING TO AND FROM BED TO CHAIR: A LITTLE
SUGGESTED CMS G CODE MODIFIER MOBILITY: CK
EATING MEALS: A LITTLE

## 2021-01-10 ASSESSMENT — ENCOUNTER SYMPTOMS
VOMITING: 0
CONSTIPATION: 0
CHILLS: 0
BLURRED VISION: 0
WEIGHT LOSS: 0
EYE PAIN: 0
COUGH: 0
SORE THROAT: 0
DIZZINESS: 0
WEAKNESS: 0
SHORTNESS OF BREATH: 0
LOSS OF CONSCIOUSNESS: 0
PALPITATIONS: 0
FEVER: 0
ORTHOPNEA: 0
HEMOPTYSIS: 0
DIARRHEA: 1
WHEEZING: 0
MYALGIAS: 0
ABDOMINAL PAIN: 1
BLOOD IN STOOL: 0
NAUSEA: 0

## 2021-01-10 ASSESSMENT — LIFESTYLE VARIABLES
HOW MANY TIMES IN THE PAST YEAR HAVE YOU HAD 5 OR MORE DRINKS IN A DAY: 0
EVER FELT BAD OR GUILTY ABOUT YOUR DRINKING: NO
HAVE PEOPLE ANNOYED YOU BY CRITICIZING YOUR DRINKING: NO
ALCOHOL_USE: NO
TOTAL SCORE: 0
ON A TYPICAL DAY WHEN YOU DRINK ALCOHOL HOW MANY DRINKS DO YOU HAVE: 0
EVER HAD A DRINK FIRST THING IN THE MORNING TO STEADY YOUR NERVES TO GET RID OF A HANGOVER: NO
AVERAGE NUMBER OF DAYS PER WEEK YOU HAVE A DRINK CONTAINING ALCOHOL: 0
TOTAL SCORE: 0
TOTAL SCORE: 0
DOES PATIENT WANT TO STOP DRINKING: NO
HAVE YOU EVER FELT YOU SHOULD CUT DOWN ON YOUR DRINKING: NO
CONSUMPTION TOTAL: NEGATIVE

## 2021-01-10 ASSESSMENT — PAIN DESCRIPTION - PAIN TYPE
TYPE: ACUTE PAIN

## 2021-01-10 ASSESSMENT — PATIENT HEALTH QUESTIONNAIRE - PHQ9
SUM OF ALL RESPONSES TO PHQ9 QUESTIONS 1 AND 2: 0
1. LITTLE INTEREST OR PLEASURE IN DOING THINGS: NOT AT ALL
2. FEELING DOWN, DEPRESSED, IRRITABLE, OR HOPELESS: NOT AT ALL

## 2021-01-10 NOTE — ED PROVIDER NOTES
ED Provider Note    CHIEF COMPLAINT  Chief Complaint   Patient presents with   • Abdominal Pain     started today, recent hernia surgery 12/15, pt diagnosed with SBO in outside facility       [facility]  Francis Enamorado is a 40 y.o. male who presents to the emergency department as a transfer from [facility]. Patient recently underwent hiatal hernia repair by Dr. Ganser on December 16. He had a progressing postoperative course and has been doing well. Today however he had a burrito which insanely caused increasing abdominal pain and he then went to the ER where a CT showed acute small bowel obstruction. Laboratory evaluation showed significantly elevated lipase consistent with acute pancreatitis although no CT imaging of acute inflammatory process there. NG tube was placed inpatient transferred at this facility. Currently the patient states that his pain is significant better after the injury to placement. Pain is currently only mild to moderate. He did have a COVID catheter which was negative. No other significant changes from baseline.    [facility] chart review does reveal that the patient required Haldol, Benadryl, Ativan as well as ketamine for pain control prior to CT imaging. Additionally he is on eloquent secondary to prior DVT.  REVIEW OF SYSTEMS  See HPI for further details. All other systems are negative.     PAST MEDICAL HISTORY   has a past medical history of DVT (deep venous thrombosis) ([facility]), Embolism - blood clot, GI bleed, Heart burn, PE (PHYSICAL EXAM), and Snoring.    SOCIAL HISTORY  Social History     Tobacco Use   • Smoking status: Never Smoker   • Smokeless tobacco: Never Used   Substance and Sexual Activity   • Alcohol use: No   • Drug use: No   • Sexual activity: Not on file       SURGICAL HISTORY   has a past surgical history that includes colonoscopy (2006); other orthopedic surgery (1996); gastroscopy with biopsy (12/17/2009); and lap,esophagogast fundoplasty (N/A, 12/15/2020).    CURRENT  "MEDICATIONS  Home Medications    **Home medications have not yet been reviewed for this encounter**         ALLERGIES  No Known Allergies    PHYSICAL EXAM  VITAL SIGNS: /65   Pulse (!) 109   Temp 37.3 °C (99.1 °F) (Temporal)   Resp (!) 54   Ht 1.727 m (5' 8\")   Wt 90.7 kg (200 lb)   SpO2 93%   BMI 30.41 kg/m²  @CARLOS ALBERTO[[id number]::@   Pulse ox interpretation: I interpret this pulse ox as normal.  Constitutional: Alert in no apparent distress.  HENT: No signs of trauma, Bilateral external ears normal, Nose normal. NG tube in place  Eyes: Pupils are equal and reactive, Conjunctiva normal, Non-icteric.   Neck: Normal range of motion, No tenderness   Cardiovascular: Regular rate and rhythm, no murmurs.   Thorax & Lungs: Normal breath sounds, No respiratory  Abdomen: hyperactive bowel sounds, distended, mild diffuse tenderness. Well appearing surgical site.  Skin: Warm, Dry, No erythema, No rash.   Extremities: Intact distal pulses, No edema, No tenderness   Musculoskeletal: Good range of motion in all major joints. No tenderness to palpation or major deformities noted.   Neurologic: Alert , Normal motor function, Normal sensory function, No focal deficits noted.   Psychiatric: Affect normal, Judgment normal, Mood normal.       DIAGNOSTIC STUDIES / PROCEDURES      LABS  Results for orders placed or performed during the hospital encounter of 01/10/21   CBC with Differential   Result Value Ref Range    WBC 12.0 (H) 4.8 - 10.8 K/uL    RBC 5.48 4.70 - 6.10 M/uL    Hemoglobin 15.5 14.0 - 18.0 g/dL    Hematocrit 49.7 42.0 - 52.0 %    MCV 90.7 81.4 - 97.8 fL    MCH 28.3 27.0 - 33.0 pg    MCHC 31.2 (L) 33.7 - 35.3 g/dL    RDW 43.2 35.9 - 50.0 fL    Platelet Count 251 164 - 446 K/uL    MPV 10.9 9.0 - 12.9 fL    Neutrophils-Polys 92.50 (H) 44.00 - 72.00 %    Lymphocytes 2.70 (L) 22.00 - 41.00 %    Monocytes 4.10 0.00 - 13.40 %    Eosinophils 0.10 0.00 - 6.90 %    Basophils 0.10 0.00 - 1.80 %    Immature Granulocytes 0.50 0.00 " - 0.90 %    Nucleated RBC 0.00 /100 WBC    Neutrophils (Absolute) 11.08 (H) 1.82 - 7.42 K/uL    Lymphs (Absolute) 0.32 (L) 1.00 - 4.80 K/uL    Monos (Absolute) 0.49 0.00 - 0.85 K/uL    Eos (Absolute) 0.01 0.00 - 0.51 K/uL    Baso (Absolute) 0.01 0.00 - 0.12 K/uL    Immature Granulocytes (abs) 0.06 0.00 - 0.11 K/uL    NRBC (Absolute) 0.00 K/uL   Comp Metabolic Panel (CMP)   Result Value Ref Range    Sodium 138 135 - 145 mmol/L    Potassium 4.4 3.6 - 5.5 mmol/L    Chloride 107 96 - 112 mmol/L    Co2 25 20 - 33 mmol/L    Anion Gap 6.0 (L) 7.0 - 16.0    Glucose 127 (H) 65 - 99 mg/dL    Bun 9 8 - 22 mg/dL    Creatinine 0.65 0.50 - 1.40 mg/dL    Calcium 8.7 8.5 - 10.5 mg/dL    AST(SGOT) 23 12 - 45 U/L    ALT(SGPT) 25 2 - 50 U/L    Alkaline Phosphatase 68 30 - 99 U/L    Total Bilirubin 0.2 0.1 - 1.5 mg/dL    Albumin 4.3 3.2 - 4.9 g/dL    Total Protein 6.9 6.0 - 8.2 g/dL    Globulin 2.6 1.9 - 3.5 g/dL    A-G Ratio 1.7 g/dL   Magnesium   Result Value Ref Range    Magnesium 1.9 1.5 - 2.5 mg/dL   Lipid Profile (Lipid Panel) Fasting   Result Value Ref Range    Cholesterol,Tot 170 100 - 199 mg/dL    Triglycerides 66 0 - 149 mg/dL    HDL 35 (A) >=40 mg/dL     (H) <100 mg/dL   DIAGNOSTIC ALCOHOL   Result Value Ref Range    Diagnostic Alcohol <10.1 0.0 - 10.0 mg/dL   HEMOGLOBIN A1C   Result Value Ref Range    Glycohemoglobin 5.5 0.0 - 5.6 %    Est Avg Glucose 111 mg/dL   ESTIMATED GFR   Result Value Ref Range    GFR If African American >60 >60 mL/min/1.73 m 2    GFR If Non African American >60 >60 mL/min/1.73 m 2             COURSE & MEDICAL DECISION MAKING  Pertinent Labs & Imaging studies reviewed. (See chart for details)  patient presented emergency department as a transfer from [place] for acute SBO and pancreatitis. Patient with recent hiatal hernia repair on December 16. Acutely symptomatic tonight. NG tube in place. Patient feeling better at this time. I discussed the case with general surgery on call Dr. Stock for   [facility]. I've also consulted with the hospitals for primary admission and surgery will continue to consult. Patient will be kept NPO.    FINAL IMPRESSION  1. SBO (small bowel obstruction) (HCC)    2. Acute pancreatitis, unspecified complication status, unspecified pancreatitis type            Electronically signed by: Alexis Greer M.D., 1/10/2021 2:20 AM

## 2021-01-10 NOTE — DISCHARGE PLANNING
Bedside assessment completed with information obtained from pt. Pt perceives no needs at this time. Normally pt is very independent and has no difficulty preforming ADL's. Pt uses the [facility] pharmacy in [place]. Pt's wife (Mercy) will be available once pt is DC for transportation home.    Care Transition Team Assessment    Information Source  Orientation : Oriented x 4  Information Given By: Patient  Who is responsible for making decisions for patient? : Patient    Readmission Evaluation  Is this a readmission?: No    Elopement Risk  Legal Hold: No  Ambulatory or Self Mobile in Wheelchair: No-Not an Elopement Risk    Interdisciplinary Discharge Planning  Does Admitting Nurse Feel This Could be a Complex Discharge?: No  Primary Care Physician: Cedric Painter DO  Lives with - Patient's Self Care Capacity: Child Less than 18 Years of Age, Spouse  Support Systems: Children, Spouse / Significant Other  Housing / Facility: [address]  Do You Take your Prescribed Medications Regularly: ([facility])  Able to Return to Previous ADL's: Yes  Mobility Issues: No  Prior Services: None  Patient Prefers to be Discharged to:: Home  Assistance Needed: No  Durable Medical Equipment: Not Applicable    Discharge Preparedness  What are your discharge supports?: Spouse, Child  Prior Functional Level: Ambulatory, Drives Self, Independent with Activities of Daily Living, Independent with Medication Management  Difficulity with ADLs: None  Difficulity with IADLs: None    Functional Assesment  Prior Functional Level: Ambulatory, Drives Self, Independent with Activities of Daily Living, Independent with Medication Management    Finances  Financial Barriers to Discharge: No  Prescription Coverage: Yes    Vision / Hearing Impairment  Vision Impairment : No  Hearing Impairment : No         Advance Directive  Advance Directive?: None  Advance Directive offered?: AD Booklet refused    Domestic Abuse  Have you  ever been the victim of abuse or violence?: No    Psychological Assessment  History of Substance Abuse: None  History of Psychiatric Problems: No    Discharge Risks or Barriers  Discharge risks or barriers?: No    Anticipated Discharge Information  Discharge Disposition: Still a Patient (30)  Discharge Address: [address]  Discharge Contact Phone Number: Mercy (spouse) [phone]

## 2021-01-10 NOTE — PROGRESS NOTES
Assumed care of pt from ER. Pt is laying in bed, call light within reach, bed lowered and locked. Pt is A&Ox4 and on room air. Pt lung sounds are clear in all lobes, bowel sounds are hypoactive in all four quadrants, heart sounds are within defined limits. PT IV is clean,dry,intact,and patent and saline locked. Pt has a left nare NG tube with tape dressing CDI to gravity drainage at this time. Pt has 5 old abdominal lap sites MAURICIO. Pt denies pain and nausea at this time

## 2021-01-10 NOTE — CONSULTS
DATE OF CONSULTATION: 1/10/2021     REFERRING PHYSICIAN: MD PRUDENCE.     CONSULTING PHYSICIAN: Mandeep Stock M.D.      REASON FOR CONSULTATION: Evaluate patient with small bowel obstruction    HISTORY OF PRESENT ILLNESS: The patient is a 40-year-old gentleman who presented to the emergency room in [place] with acute onset of abdominal pain nausea and vomiting.  Patient reports that the pain began after eating a burrito.  Work-up was initiated which demonstrated findings consistent with that of small bowel obstruction.  The patient's recent history is significant for repair of an hiatal hernia by Dr. Ganser on December 16.  That procedure was performed without complication.  The patient had a nasogastric tube placed and was transferred to [facility] for further evaluation definitive care.  Of note the patient was also diagnosed with pancreatitis with an elevated lipase.  The patient denies alcohol use denies history of gallstones.  This morning the patient is pain-free.  He has no complaints.  He reports feeling much better.  Patient also reports that he is passing significant volumes of flatus.  Patient has a history of deep vein thrombosis and is on maintenance anticoagulation.  The patient's Eliquis has been discontinued and has been placed on a heparin drip.    PAST MEDICAL HISTORY:  has a past medical history of DVT (deep venous thrombosis) ([facility]), Embolism - blood clot, GI bleed, Heart burn, PE (PHYSICAL EXAM), and Snoring.     PAST SURGICAL HISTORY:  has a past surgical history that includes colonoscopy (2006); other orthopedic surgery (1996); gastroscopy with biopsy (12/17/2009); and lap,esophagogast fundoplasty (N/A, 12/15/2020).     ALLERGIES: No Known Allergies     CURRENT MEDICATIONS:   Home Medications    **Home medications have not yet been reviewed for this encounter**         FAMILY HISTORY: History reviewed. No pertinent family history.     SOCIAL HISTORY:   Social History     Tobacco Use   • Smoking status:  Never Smoker   • Smokeless tobacco: Never Used   Substance and Sexual Activity   • Alcohol use: No   • Drug use: No   • Sexual activity: Not on file       Review of Systems:      Constitutional: Denies fevers, Denies weight changes  Eyes: Denies changes in vision, no eye pain  Ears/Nose/Throat/Mouth: Denies nasal congestion or sore throat   Cardiovascular: Denies chest pain or palpitations.  Respiratory: Denies shortness of breath, cough, and wheezing.  Gastrointestinal/Hepatic: Patient reports previous severe abdominal pain but now abdominal pain has resolved.    Genitourinary: Denies dysuria or frequency  Musculoskeletal/Rheum: Denies  joint pain and swelling, no edema  Skin: Denies rash  Neurological: Denies headache, confusion, memory loss or focal weakness/parasthesias  Psychiatric: denies mood disorder   Endocrine: Sophie thyroid problems  Heme/Oncology/Lymph Nodes: Denies enlarged lymph nodes, denies brusing or known bleeding disorder  All other systems were reviewed and are negative (AMA/CMS criteria)                  PHYSICAL EXAMINATION:       Constitutional:   Well developed, Well nourished, No acute distress  HENMT:  Normocephalic, Atraumatic, Oropharynx moist mucous membranes, No oral exudates, Nose normal.  No thyromegaly.  Nasogastric tube in place  Eyes:  EOMI, Conjunctiva normal, No discharge.  Neck:  Normal range of motion, No cervical tenderness,  no JVD.  Cardiovascular:  Normal heart rate, Normal rhythm, No murmurs, No rubs, No gallops.   Extremitites with intact distal pulses, no cyanosis, or edema.  Lungs:  Normal breath sounds, breath sounds clear to auscultation bilaterally,  no crackles, no wheezing.   Abdomen: Bowel sounds normal, Soft, No tenderness, No guarding, No rebound, No masses, No hepatosplenomegaly.  Skin: Warm, Dry, No erythema, No rash, no induration.  Neurologic: Alert & oriented x 3, No focal deficits noted, cranial nerves II through X are grossly intact.  Psychiatric: Affect  normal, Judgment normal, Mood normal.        LABORATORY VALUES:   Recent Labs     01/10/21  0200   WBC 12.0*   RBC 5.48   HEMOGLOBIN 15.5   HEMATOCRIT 49.7   MCV 90.7   MCH 28.3   MCHC 31.2*   RDW 43.2   PLATELETCT 251   MPV 10.9     Recent Labs     01/10/21  0200   SODIUM 138   POTASSIUM 4.4   CHLORIDE 107   CO2 25   GLUCOSE 127*   BUN 9   CREATININE 0.65   CALCIUM 8.7     Recent Labs     01/10/21  0200   ASTSGOT 23   ALTSGPT 25   TBILIRUBIN 0.2   ALKPHOSPHAT 68   GLOBULIN 2.6            IMAGING:   DX-ABDOMEN FOR TUBE PLACEMENT   Final Result      NG tube terminates over the stomach.      OUTSIDE IMAGES-CT ABDOMEN /PELVIS   Final Result      OUTSIDE IMAGES-DX CHEST   Final Result          IMPRESSION AND PLAN:     Active Hospital Problems    Diagnosis   • Hyperglycemia [R73.9]     Priority: Low   • SBO (small bowel obstruction) ([facility] [K56.609]   • Pancreatitis [K85.90]   • DVT (deep venous thrombosis) ([facility]) [I82.409]     Small bowel obstruction versus ileus  Resolution of abdominal distention and discomfort after nasogastric decompression.  Process appears to be resolving as the patient reports passing large volumes of flatus  Etiology of pancreatitis unknown  Will obtain ultrasound of gallbladder  Case will be discussed with Dr. Ganser  Continue nasogastric decompression IV fluids today  Continue to hold novel anticoagulant in the event that surgery is warranted after further work-up.    ____________________________________   Mandeep Stock M.D.          DD: 1/10/2021   DT: 8:00 AM

## 2021-01-10 NOTE — PROGRESS NOTES
This is 40 years old male with past medical history of DVT on Eliquis and recent hiatal hernia repair done on 12/16/2020 who was transferred from psych facility due to concern of small bowel obstruction and pancreatitis.  Apparently lipase at outside facility was 8467.  LFTs within normal limits.  Transferred here for surgical consult.  NG tube placed.  Patient is passing flatus but no bowel movement.  Continue with NG tube with intermittent suctioning due to distended abdomen and hypoactive bowels.  Right upper quadrant ultrasound ordered to investigate further for pancreatitis.  Repeat lipase was 915.  Lipid panel with no hypertriglyceridemia.  Noted to have hyperglycemia.  Hemoglobin A1c came back as 5.5%.  This was attributed to acute pancreatitis.  Alcohol levels came back as undetectable. Continue IVF. Therapeutic Lovenox for now for DVT.

## 2021-01-10 NOTE — ED TRIAGE NOTES
Chief Complaint   Patient presents with   • Abdominal Pain     started today, recent hernia surgery 12/15, pt diagnosed with SBO in outside facility     Pt BIB med flight as transfer from outside facility in [place]. Pt reports abdominal pain and was diagnosed with a SBO in outside facility. Pt denies N/V. NG tube in place PTA, placement verified via auscultation and hooked up to suction. Pt denies pain at this time. Pt is a+ox4.

## 2021-01-10 NOTE — ASSESSMENT & PLAN NOTE
Pt reports history of DVT LLE 1 year ago  Is on long term Eliquis therapy. Last dose yesterday  Denies LLE pain/swelling or respiratory complaints.  - hold eliquis  - Lovenox 1mg/kg BID

## 2021-01-10 NOTE — PROGRESS NOTES
Received report from NOC RN.   Pt A & O x4.   Denies pain/nausea/SOB.   +passing flatus.   NG tube to L nare secured with tape. Low intermit suction per MD. Green/ brown output.   Ambulates SB with steady gait  2/2 NG tube.    Nursing concerns of K+ repletion, NG suction, and tachycardia addressed with MD. Orders received.     Pt able to make needs known, all needs met at this time. Fall education provided and interventions in place.

## 2021-01-10 NOTE — ASSESSMENT & PLAN NOTE
Severe 10/10 abd pain after eating today  Associated w/ abd distention and firmness  Recent hernia repair mid December 2020  Continue with NG tube with intermittent suctioning.  IV fluids.  1/11: Patient passing flatus.  Had a bowel movement in the morning.  NG tube removed.  Possibly barium swallow study tomorrow as patient complaining of dysphagia.  Surgery following.

## 2021-01-10 NOTE — H&P
Hospital Medicine History & Physical Note    Date of Service  1/10/2021    Primary Care Physician  Cedric Painter D.O.    Consultants  Surgery    Code Status  Full Code    Chief Complaint  Chief Complaint   Patient presents with   • Abdominal Pain     started today, recent hernia surgery 12/15, pt diagnosed with SBO in outside facility       History of Presenting Illness  40 y.o. male who presented 1/10/2021 as transfer from [place] for SBO. Pt recently underwent hiatal hernia repair by Dr Ganser on 12/16. Surgery went w/o complications and pt has been feeling well. Today, pt reports he ate a burrito and very shortly after felt sever 10/10 constant bloating abdominal pain that was not resolving. He reports he took milk of magnesia to dry to help with the pain and had 1 incident of a small amount of non bloody diarrhea. During this time, he additionally noted that his stomach was extremely distended and hard as a rock. It was at this time he decided to go tot he ED in [place]. While there, CT abd was performed w/ findings of SBO. Pt was also noted to have elevated Lipase level of 8467. There was no pancreatitis findings on CT imaging per chart. Pt was given Haldol, Benadryl, Ativan, and ketamine for pain. Additionally he had NG tube placed. He was then transferred to [facility] for further management and f/u with surgery.     [facility] ED: Vitals w/ , RR 20, otherwise unremarkable. He reports his abdominal pain has now resolved. Additionally, his stomach is not nearly as distended as it was and is soft, and non tender to palpation. He currently denies any fever, chills, nausea, vomiting, SOB, chest pain. He reports no complaints at this time.     Outside Labs:  WBC 8.2, Hb 15.2, Plt 270, Na 143, K 3.3, Cl 109, Bicarb 25.2, BUN/Cr 11/1.01, Glucose 239, Lipase 8467, COVID negative.     CT abd results uploaded, no official report in physical chart or on file.     Review of Systems  Review of Systems   Constitutional: Negative for  chills, fever and weight loss.   HENT: Negative for hearing loss and sore throat.    Eyes: Negative for blurred vision and pain.   Respiratory: Negative for cough, hemoptysis, shortness of breath and wheezing.    Cardiovascular: Negative for chest pain, palpitations, orthopnea and leg swelling.   Gastrointestinal: Positive for abdominal pain and diarrhea (x1). Negative for blood in stool, constipation, nausea and vomiting.        Abdominal distention and firmness   Genitourinary: Negative for dysuria and hematuria.   Musculoskeletal: Negative for joint pain and myalgias.   Skin: Negative for rash.   Neurological: Negative for dizziness, loss of consciousness and weakness.       Past Medical History   has a past medical history of DVT (deep venous thrombosis) ([facility]), Embolism - blood clot, GI bleed, Heart burn, PE (PHYSICAL EXAM), and Snoring.    Surgical History   has a past surgical history that includes colonoscopy (2006); other orthopedic surgery (1996); gastroscopy with biopsy (12/17/2009); and pr lap,esophagogast fundoplasty (N/A, 12/15/2020).     Family History  No pertinent family history    Social History   reports that he has never smoked. He has never used smokeless tobacco. He reports that he does not drink alcohol or use drugs.    Allergies  No Known Allergies    Medications  Prior to Admission Medications   Prescriptions Last Dose Informant Patient Reported? Taking?   Ascorbic Acid (VITAMIN C PO)  Patient Yes No   Sig: Take 1 Tab by mouth 2 Times a Day.   apixaban (ELIQUIS) 5mg Tab  Patient Yes No   Sig: Take 5 mg by mouth 2 Times a Day.   ferrous sulfate 325 (65 FE) MG tablet  Patient Yes No   Sig: Take 325 mg by mouth 2 Times a Day.      Facility-Administered Medications: None       Physical Exam  Temp:  [37.3 °C (99.1 °F)] 37.3 °C (99.1 °F)  Pulse:  [100-101] 100  Resp:  [16-20] 20  BP: (118-126)/(78-80) 118/78  SpO2:  [95 %-96 %] 96 %    Physical Exam  Vitals signs and nursing note reviewed.    Constitutional:       General: He is not in acute distress.     Appearance: Normal appearance.   HENT:      Mouth/Throat:      Mouth: Mucous membranes are moist.   Eyes:      Extraocular Movements: Extraocular movements intact.   Cardiovascular:      Rate and Rhythm: Normal rate and regular rhythm.      Heart sounds: No murmur. No gallop.    Pulmonary:      Effort: Pulmonary effort is normal.      Breath sounds: Normal breath sounds. No wheezing, rhonchi or rales.   Abdominal:      General: Bowel sounds are normal. There is distension.      Palpations: Abdomen is soft. There is no mass.      Tenderness: There is no abdominal tenderness. There is no guarding or rebound.      Hernia: No hernia is present.   Musculoskeletal: Normal range of motion.         General: No deformity.      Right lower leg: No edema.      Left lower leg: No edema.   Neurological:      General: No focal deficit present.      Mental Status: He is alert and oriented to person, place, and time.         Laboratory:          No results for input(s): ALTSGPT, ASTSGOT, ALKPHOSPHAT, TBILIRUBIN, DBILIRUBIN, GAMMAGT, AMYLASE, LIPASE, ALB, PREALBUMIN, GLUCOSE in the last 72 hours.      No results for input(s): NTPROBNP in the last 72 hours.      No results for input(s): TROPONINT in the last 72 hours.    Imaging:  OUTSIDE IMAGES-CT ABDOMEN /PELVIS   Final Result      OUTSIDE IMAGES-DX CHEST   Final Result            Assessment/Plan:  I anticipate this patient is appropriate for observation status at this time.    * SBO (small bowel obstruction) (HCC)- (present on admission)  Assessment & Plan  Severe 10/10 abd pain after eating today  Associated w/ abd distention and firmness  Recent hernia repair mid December 2020  No prior history of SBO in the past  1 small BM since onset of pain. Pain now resolved.   - NPO  - IVF  - bowel rest  - analgesics if needed  - Surgery ([facility]) will see tomorrow    DVT (deep venous thrombosis) (HCC)- (present  on admission)  Assessment & Plan  Pt reports history of DVT LLE 1 year ago  Is on long term Eliquis therapy. Last dose yesterday  Denies LLE pain/swelling or respiratory complaints.  - hold eliquis  - Lovenox 1mg/kg BID    Pancreatitis- (present on admission)  Assessment & Plan  Abd pain earlier today that has now resolved  No associated nausea or vomiting  Lipase 8467  Denies any recent alcohol use, trauma, gall stones, HLD, or changes in medications.  - NPO  - f/u lipid panel and alcohol level  - NS 150cc/hr  - analgesics if needed    Hyperglycemia- (present on admission)  Assessment & Plan  Glucose 239  Denies history of DM  - f/u with A1c  - ISS    VTE: SCD RLE, Full dose Lovenox

## 2021-01-10 NOTE — PROGRESS NOTES
2 Rn skin check complete  Pt has an PIV in the left AC with dressing CDI  Pt sacrum is pink and blanching  All bony prominences intact  NG tube to the left nare with tape dressing CDI  5 old abdominal lap sites present MAURICIO

## 2021-01-10 NOTE — ASSESSMENT & PLAN NOTE
Abd pain earlier today that has now resolved  No associated nausea or vomiting  Lipase 8467 at the other facility  Alcohol levels undetectable.  Lipid panel with no hypertriglyceridemia  Recheck lipase.  Right upper quadrant ultrasound ordered.  1/11: Lipase normalized. RUQ US with no gallstones. CBD WNL. Unable to visualize pancrease well. No peripancreatic fluid.

## 2021-01-11 LAB
ALBUMIN SERPL BCP-MCNC: 3.6 G/DL (ref 3.2–4.9)
ALBUMIN/GLOB SERPL: 1.4 G/DL
ALP SERPL-CCNC: 74 U/L (ref 30–99)
ALT SERPL-CCNC: 19 U/L (ref 2–50)
ANION GAP SERPL CALC-SCNC: 10 MMOL/L (ref 7–16)
AST SERPL-CCNC: 15 U/L (ref 12–45)
BASOPHILS # BLD AUTO: 0.1 % (ref 0–1.8)
BASOPHILS # BLD: 0.01 K/UL (ref 0–0.12)
BILIRUB SERPL-MCNC: 0.5 MG/DL (ref 0.1–1.5)
BUN SERPL-MCNC: 6 MG/DL (ref 8–22)
CALCIUM SERPL-MCNC: 8.7 MG/DL (ref 8.5–10.5)
CHLORIDE SERPL-SCNC: 105 MMOL/L (ref 96–112)
CO2 SERPL-SCNC: 24 MMOL/L (ref 20–33)
CREAT SERPL-MCNC: 0.83 MG/DL (ref 0.5–1.4)
EOSINOPHIL # BLD AUTO: 0.28 K/UL (ref 0–0.51)
EOSINOPHIL NFR BLD: 4 % (ref 0–6.9)
ERYTHROCYTE [DISTWIDTH] IN BLOOD BY AUTOMATED COUNT: 42.4 FL (ref 35.9–50)
GLOBULIN SER CALC-MCNC: 2.6 G/DL (ref 1.9–3.5)
GLUCOSE BLD-MCNC: 109 MG/DL (ref 65–99)
GLUCOSE BLD-MCNC: 90 MG/DL (ref 65–99)
GLUCOSE BLD-MCNC: 91 MG/DL (ref 65–99)
GLUCOSE SERPL-MCNC: 104 MG/DL (ref 65–99)
HCT VFR BLD AUTO: 46.3 % (ref 42–52)
HGB BLD-MCNC: 14.4 G/DL (ref 14–18)
IMM GRANULOCYTES # BLD AUTO: 0.02 K/UL (ref 0–0.11)
IMM GRANULOCYTES NFR BLD AUTO: 0.3 % (ref 0–0.9)
LIPASE SERPL-CCNC: 64 U/L (ref 11–82)
LYMPHOCYTES # BLD AUTO: 0.75 K/UL (ref 1–4.8)
LYMPHOCYTES NFR BLD: 10.6 % (ref 22–41)
MCH RBC QN AUTO: 28.1 PG (ref 27–33)
MCHC RBC AUTO-ENTMCNC: 31.1 G/DL (ref 33.7–35.3)
MCV RBC AUTO: 90.3 FL (ref 81.4–97.8)
MONOCYTES # BLD AUTO: 0.55 K/UL (ref 0–0.85)
MONOCYTES NFR BLD AUTO: 7.8 % (ref 0–13.4)
NEUTROPHILS # BLD AUTO: 5.45 K/UL (ref 1.82–7.42)
NEUTROPHILS NFR BLD: 77.2 % (ref 44–72)
NRBC # BLD AUTO: 0 K/UL
NRBC BLD-RTO: 0 /100 WBC
PLATELET # BLD AUTO: 205 K/UL (ref 164–446)
PMV BLD AUTO: 10.1 FL (ref 9–12.9)
POTASSIUM SERPL-SCNC: 4 MMOL/L (ref 3.6–5.5)
PROT SERPL-MCNC: 6.2 G/DL (ref 6–8.2)
RBC # BLD AUTO: 5.13 M/UL (ref 4.7–6.1)
SODIUM SERPL-SCNC: 139 MMOL/L (ref 135–145)
WBC # BLD AUTO: 7.1 K/UL (ref 4.8–10.8)

## 2021-01-11 PROCEDURE — 82962 GLUCOSE BLOOD TEST: CPT | Mod: 91

## 2021-01-11 PROCEDURE — 770006 HCHG ROOM/CARE - MED/SURG/GYN SEMI*

## 2021-01-11 PROCEDURE — 85025 COMPLETE CBC W/AUTO DIFF WBC: CPT

## 2021-01-11 PROCEDURE — 80053 COMPREHEN METABOLIC PANEL: CPT

## 2021-01-11 PROCEDURE — 700111 HCHG RX REV CODE 636 W/ 250 OVERRIDE (IP): Performed by: STUDENT IN AN ORGANIZED HEALTH CARE EDUCATION/TRAINING PROGRAM

## 2021-01-11 PROCEDURE — 99231 SBSQ HOSP IP/OBS SF/LOW 25: CPT | Performed by: FAMILY MEDICINE

## 2021-01-11 PROCEDURE — 83690 ASSAY OF LIPASE: CPT

## 2021-01-11 RX ADMIN — ENOXAPARIN SODIUM 100 MG: 100 INJECTION SUBCUTANEOUS at 04:37

## 2021-01-11 RX ADMIN — ENOXAPARIN SODIUM 100 MG: 100 INJECTION SUBCUTANEOUS at 17:09

## 2021-01-11 ASSESSMENT — ENCOUNTER SYMPTOMS
BLURRED VISION: 0
CHILLS: 0
HEARTBURN: 0
PALPITATIONS: 0
COUGH: 0
ORTHOPNEA: 0
ABDOMINAL PAIN: 1
VOMITING: 0
DIZZINESS: 0
HEMOPTYSIS: 0
SHORTNESS OF BREATH: 0
DEPRESSION: 0
FEVER: 0
DOUBLE VISION: 0
NAUSEA: 0
MYALGIAS: 0
HEADACHES: 0

## 2021-01-11 ASSESSMENT — PAIN DESCRIPTION - PAIN TYPE: TYPE: ACUTE PAIN

## 2021-01-11 NOTE — PROGRESS NOTES
Surgical Progress Note    Author: WILLIAM Tate Date & Time created: 2021   12:54 PM     Interval Events:  Pt feeling better - very mild LUQ discomfort; denies dysphagia or N/V with clears; passing flatus; ambulatory    Review of Systems   Constitutional: Negative for chills and fever.   Respiratory: Negative for shortness of breath.    Gastrointestinal: Positive for abdominal pain. Negative for heartburn, nausea and vomiting.   Skin: Negative for itching and rash.     Hemodynamics:  Temp (24hrs), Av.7 °C (98 °F), Min:36.4 °C (97.6 °F), Max:36.8 °C (98.3 °F)  Temperature: 36.8 °C (98.2 °F)  Pulse  Av.3  Min: 77  Max: 110   Blood Pressure: 122/75     Respiratory:    Respiration: 16, Pulse Oximetry: 95 %           Neuro:  GCS       Fluids:    Intake/Output Summary (Last 24 hours) at 2021 1254  Last data filed at 2021 0800  Gross per 24 hour   Intake 500 ml   Output 0 ml   Net 500 ml        Current Diet Order   Procedures   • Diet Order Diet: Clear Liquid     Physical Exam  Vitals signs and nursing note reviewed.   Constitutional:       General: He is not in acute distress.  Cardiovascular:      Rate and Rhythm: Normal rate.   Pulmonary:      Effort: Pulmonary effort is normal. No respiratory distress.   Abdominal:      General: There is no distension.      Palpations: Abdomen is soft.      Tenderness: There is no guarding or rebound.      Comments: Slight LUQ tenderness to deep palpation   Neurological:      Mental Status: He is alert and oriented to person, place, and time.   Psychiatric:         Mood and Affect: Mood normal.       Labs:  Recent Results (from the past 24 hour(s))   ACCU-CHEK GLUCOSE    Collection Time: 01/10/21  1:18 PM   Result Value Ref Range    Glucose - Accu-Ck 89 65 - 99 mg/dL   ACCU-CHEK GLUCOSE    Collection Time: 01/10/21  5:18 PM   Result Value Ref Range    Glucose - Accu-Ck 90 65 - 99 mg/dL   ACCU-CHEK GLUCOSE    Collection Time: 21 12:04 AM   Result  Value Ref Range    Glucose - Accu-Ck 91 65 - 99 mg/dL   CBC WITH DIFFERENTIAL    Collection Time: 01/11/21  4:49 AM   Result Value Ref Range    WBC 7.1 4.8 - 10.8 K/uL    RBC 5.13 4.70 - 6.10 M/uL    Hemoglobin 14.4 14.0 - 18.0 g/dL    Hematocrit 46.3 42.0 - 52.0 %    MCV 90.3 81.4 - 97.8 fL    MCH 28.1 27.0 - 33.0 pg    MCHC 31.1 (L) 33.7 - 35.3 g/dL    RDW 42.4 35.9 - 50.0 fL    Platelet Count 205 164 - 446 K/uL    MPV 10.1 9.0 - 12.9 fL    Neutrophils-Polys 77.20 (H) 44.00 - 72.00 %    Lymphocytes 10.60 (L) 22.00 - 41.00 %    Monocytes 7.80 0.00 - 13.40 %    Eosinophils 4.00 0.00 - 6.90 %    Basophils 0.10 0.00 - 1.80 %    Immature Granulocytes 0.30 0.00 - 0.90 %    Nucleated RBC 0.00 /100 WBC    Neutrophils (Absolute) 5.45 1.82 - 7.42 K/uL    Lymphs (Absolute) 0.75 (L) 1.00 - 4.80 K/uL    Monos (Absolute) 0.55 0.00 - 0.85 K/uL    Eos (Absolute) 0.28 0.00 - 0.51 K/uL    Baso (Absolute) 0.01 0.00 - 0.12 K/uL    Immature Granulocytes (abs) 0.02 0.00 - 0.11 K/uL    NRBC (Absolute) 0.00 K/uL   Comp Metabolic Panel    Collection Time: 01/11/21  4:49 AM   Result Value Ref Range    Sodium 139 135 - 145 mmol/L    Potassium 4.0 3.6 - 5.5 mmol/L    Chloride 105 96 - 112 mmol/L    Co2 24 20 - 33 mmol/L    Anion Gap 10.0 7.0 - 16.0    Glucose 104 (H) 65 - 99 mg/dL    Bun 6 (L) 8 - 22 mg/dL    Creatinine 0.83 0.50 - 1.40 mg/dL    Calcium 8.7 8.5 - 10.5 mg/dL    AST(SGOT) 15 12 - 45 U/L    ALT(SGPT) 19 2 - 50 U/L    Alkaline Phosphatase 74 30 - 99 U/L    Total Bilirubin 0.5 0.1 - 1.5 mg/dL    Albumin 3.6 3.2 - 4.9 g/dL    Total Protein 6.2 6.0 - 8.2 g/dL    Globulin 2.6 1.9 - 3.5 g/dL    A-G Ratio 1.4 g/dL   ESTIMATED GFR    Collection Time: 01/11/21  4:49 AM   Result Value Ref Range    GFR If African American >60 >60 mL/min/1.73 m 2    GFR If Non African American >60 >60 mL/min/1.73 m 2   LIPASE    Collection Time: 01/11/21  4:49 AM   Result Value Ref Range    Lipase 64 11 - 82 U/L   ACCU-CHEK GLUCOSE    Collection Time:  01/11/21  4:52 AM   Result Value Ref Range    Glucose - Accu-Ck 109 (H) 65 - 99 mg/dL     Medical Decision Making, by Problem:  Active Hospital Problems    Diagnosis   • Hyperglycemia [R73.9]     Priority: Low   • SBO (small bowel obstruction) ([facility]) [K56.609]   • Pancreatitis [K85.90]   • DVT (deep venous thrombosis) ([facility]) [I82.409]     Plan:  Case d/w Dr. Ganser/Dr. Stock: no acute surgical issues, labs normalizing.  Continue non-carbonated clears today. ADAT to full liquids in AM tomorrow. Continue full liquids until f/u appt. If patient develops dysphagia/food sticking again, then will order UGI.  Ok to d/c home in AM when cleared by medicine team.  Pt counseled re: diet, activity.  F/u with our office next week.    Quality Measures:  Quality-Core Measures   Reviewed items::  Labs reviewed and Medications reviewed  De Guzman catheter::  No De Guzman  DVT prophylaxis pharmacological::  Enoxaparin (Lovenox)  DVT prophylaxis - mechanical:  SCDs      Discussed patient condition with RN, Patient and Dr. Ganser/ Dr. Stock

## 2021-01-11 NOTE — PROGRESS NOTES
[facility] Daily Progress Note    Date of Service  1/11/2021    Chief Complaint  40 y.o. male admitted 1/10/2021 with abdominal pain associated with nausea and vomiting.     Hospital Course  This is 40 years old male with past medical history of DVT on Eliquis and recent hiatal hernia repair done on 12/16/2020 who was transferred from psych facility due to concern of small bowel obstruction and pancreatitis.  Apparently lipase at outside facility was 8467.  LFTs within normal limits.  Transferred here for surgical consult.  NG tube placed.  Patient is passing flatus but no bowel movement.  Continue with NG tube with intermittent suctioning due to distended abdomen and hypoactive bowels.  Right upper quadrant ultrasound ordered to investigate further for pancreatitis.  Repeat lipase was 915.  Lipid panel with no hypertriglyceridemia.  Noted to have hyperglycemia.  Hemoglobin A1c came back as 5.5%.  This was attributed to acute pancreatitis.  Alcohol levels came back as undetectable. Therapeutic Lovenox started for  DVT.   Interval Problem Update  Lipase trended down to normal.  Right upper quadrant ultrasound with no gallstones.  CBD within normal limits with no biliary obstruction noted.  Pancreas was not well visualized.  Patient passing flatus and had a bowel movement in the morning.  NG tube removed.  Started on clears.  Concern of dysphagia.  Planning for barium swallow study in the a.m.  Hemodynamically clinically stable.  No issues overnight per staff.    Consultants/Specialty  Surgery    Code Status  Full Code    Disposition  Home once medically cleared    Review of Systems  Review of Systems   Constitutional: Negative for chills and fever.   HENT: Negative for hearing loss and tinnitus.    Eyes: Negative for blurred vision and double vision.   Respiratory: Negative for cough and hemoptysis.    Cardiovascular: Negative for chest pain, palpitations and orthopnea.   Gastrointestinal: Negative for  heartburn and nausea.   Genitourinary: Negative for dysuria.   Musculoskeletal: Negative for myalgias.   Skin: Negative for rash.   Neurological: Negative for dizziness and headaches.   Psychiatric/Behavioral: Negative for depression and suicidal ideas.        Physical Exam  Temp:  [36.4 °C (97.6 °F)-36.8 °C (98.3 °F)] 36.8 °C (98.2 °F)  Pulse:  [80-98] 80  Resp:  [16-18] 16  BP: (121-135)/(73-82) 122/75  SpO2:  [92 %-96 %] 95 %    Physical Exam  Constitutional:       General: He is not in acute distress.  HENT:      Head: Normocephalic and atraumatic.      Mouth/Throat:      Mouth: Mucous membranes are dry.   Eyes:      Extraocular Movements: Extraocular movements intact.      Pupils: Pupils are equal, round, and reactive to light.   Cardiovascular:      Rate and Rhythm: Normal rate and regular rhythm.      Heart sounds: No friction rub. No gallop.    Pulmonary:      Effort: Pulmonary effort is normal. No respiratory distress.   Abdominal:      General: Bowel sounds are normal. There is no distension.      Palpations: Abdomen is soft.      Tenderness: There is no abdominal tenderness.   Musculoskeletal:         General: No swelling or tenderness.   Neurological:      General: No focal deficit present.      Mental Status: He is alert and oriented to person, place, and time.   Psychiatric:         Mood and Affect: Mood normal.         Fluids    Intake/Output Summary (Last 24 hours) at 1/11/2021 1319  Last data filed at 1/11/2021 0800  Gross per 24 hour   Intake 500 ml   Output 0 ml   Net 500 ml       Laboratory  Recent Labs     01/10/21  0200 01/11/21  0449   WBC 12.0* 7.1   RBC 5.48 5.13   HEMOGLOBIN 15.5 14.4   HEMATOCRIT 49.7 46.3   MCV 90.7 90.3   MCH 28.3 28.1   MCHC 31.2* 31.1*   RDW 43.2 42.4   PLATELETCT 251 205   MPV 10.9 10.1     Recent Labs     01/10/21  0200 01/11/21  0449   SODIUM 138 139   POTASSIUM 4.4 4.0   CHLORIDE 107 105   CO2 25 24   GLUCOSE 127* 104*   BUN 9 6*   CREATININE 0.65 0.83   CALCIUM  8.7 8.7             Recent Labs     01/10/21  0200   TRIGLYCERIDE 66   HDL 35*   *       Imaging  US-RUQ   Final Result      No peripancreatic fluid collections. Pancreas is poorly visualized, but appears somewhat poorly defined.      DX-ABDOMEN FOR TUBE PLACEMENT   Final Result      NG tube terminates over the stomach.      OUTSIDE IMAGES-CT ABDOMEN /PELVIS   Final Result      OUTSIDE IMAGES-DX CHEST   Final Result           Assessment/Plan  * SBO (small bowel obstruction) ([facility])- (present on admission)  Assessment & Plan  Severe 10/10 abd pain after eating today  Associated w/ abd distention and firmness  Recent hernia repair mid December 2020  Continue with NG tube with intermittent suctioning.  IV fluids.  1/11: Patient passing flatus.  Had a bowel movement in the morning.  NG tube removed.  Possibly barium swallow study tomorrow as patient complaining of dysphagia.  Surgery following.    DVT (deep venous thrombosis) ([facility])- (present on admission)  Assessment & Plan  Pt reports history of DVT LLE 1 year ago  Is on long term Eliquis therapy. Last dose yesterday  Denies LLE pain/swelling or respiratory complaints.  - hold eliquis  - Lovenox 1mg/kg BID    Pancreatitis- (present on admission)  Assessment & Plan  Abd pain earlier today that has now resolved  No associated nausea or vomiting  Lipase 8467 at the other facility  Alcohol levels undetectable.  Lipid panel with no hypertriglyceridemia  Recheck lipase.  Right upper quadrant ultrasound ordered.  1/11: Lipase normalized. RUQ US with no gallstones. CBD WNL. Unable to visualize pancrease well. No peripancreatic fluid.       Hyperglycemia- (present on admission)  Assessment & Plan  Glucose 239  Hemoglobin A1c 5.5%  Likely due to acute pancreatitis.  Continue to monitor.       VTE prophylaxis: Lovenox

## 2021-01-11 NOTE — PROGRESS NOTES
Received report from day shift RN. Assumed care of patient at 1930  Assessment complete. VSS  A+Ox4, patient calls appropriately.   Patient declines pain   Patient ambulates up self  Denies N/V, NPO   + flatus  + void  Patient on RA, denies SOB  Denies numbness and tingling.   Patient is resting comfortably in bed. Reviewed plan of care. Call light and personal belongings in reach. Bed locked and in lowest position. Hourly rounding in place. All needs met at this time.

## 2021-01-11 NOTE — PROGRESS NOTES
Surgery     Feels much better  No complaints   Minimal NGT output   Passing flatus and had 2 BMs today     Abdomen benign   Nondistended     Labs normal       SBO - resolved   Pancreatitis resolved     US - No gallstones     Will advance to clears   Will discuss with Dr. Ganser - may warrant UGI before discharge for complaints of food getting stuck periodically.   Hopefully home tomorrow     Mandeep Stock MD

## 2021-01-11 NOTE — PROGRESS NOTES
Received report from NOC RN.   Pt A & O x4.   Denies pain/nausea/SOB.   +void  +passing flatus    NG tube to L nare secured with tape. Low intermit suction per MD.   Minimal output: 150 mL since 8 am yesterday.   Ambulates self with steady gait.     Pt able to make needs known, all needs met at this time. Fall education provided and interventions in place.

## 2021-01-12 VITALS
WEIGHT: 200 LBS | HEIGHT: 68 IN | SYSTOLIC BLOOD PRESSURE: 113 MMHG | TEMPERATURE: 98.6 F | HEART RATE: 84 BPM | DIASTOLIC BLOOD PRESSURE: 78 MMHG | BODY MASS INDEX: 30.31 KG/M2 | OXYGEN SATURATION: 92 % | RESPIRATION RATE: 16 BRPM

## 2021-01-12 PROBLEM — K56.609 SBO (SMALL BOWEL OBSTRUCTION) (HCC): Status: RESOLVED | Noted: 2021-01-10 | Resolved: 2021-01-12

## 2021-01-12 PROBLEM — R73.9 HYPERGLYCEMIA: Status: RESOLVED | Noted: 2021-01-10 | Resolved: 2021-01-12

## 2021-01-12 PROBLEM — K85.90 PANCREATITIS: Status: RESOLVED | Noted: 2021-01-10 | Resolved: 2021-01-12

## 2021-01-12 LAB
ALBUMIN SERPL BCP-MCNC: 3.9 G/DL (ref 3.2–4.9)
ALBUMIN/GLOB SERPL: 1.3 G/DL
ALP SERPL-CCNC: 81 U/L (ref 30–99)
ALT SERPL-CCNC: 22 U/L (ref 2–50)
ANION GAP SERPL CALC-SCNC: 12 MMOL/L (ref 7–16)
AST SERPL-CCNC: 14 U/L (ref 12–45)
BASOPHILS # BLD AUTO: 0.3 % (ref 0–1.8)
BASOPHILS # BLD: 0.02 K/UL (ref 0–0.12)
BILIRUB SERPL-MCNC: 0.6 MG/DL (ref 0.1–1.5)
BUN SERPL-MCNC: 6 MG/DL (ref 8–22)
CALCIUM SERPL-MCNC: 9.6 MG/DL (ref 8.5–10.5)
CHLORIDE SERPL-SCNC: 100 MMOL/L (ref 96–112)
CO2 SERPL-SCNC: 26 MMOL/L (ref 20–33)
CREAT SERPL-MCNC: 0.79 MG/DL (ref 0.5–1.4)
EOSINOPHIL # BLD AUTO: 0.4 K/UL (ref 0–0.51)
EOSINOPHIL NFR BLD: 6.6 % (ref 0–6.9)
ERYTHROCYTE [DISTWIDTH] IN BLOOD BY AUTOMATED COUNT: 42 FL (ref 35.9–50)
GLOBULIN SER CALC-MCNC: 3.1 G/DL (ref 1.9–3.5)
GLUCOSE SERPL-MCNC: 98 MG/DL (ref 65–99)
HCT VFR BLD AUTO: 49 % (ref 42–52)
HGB BLD-MCNC: 15.4 G/DL (ref 14–18)
IMM GRANULOCYTES # BLD AUTO: 0.01 K/UL (ref 0–0.11)
IMM GRANULOCYTES NFR BLD AUTO: 0.2 % (ref 0–0.9)
LYMPHOCYTES # BLD AUTO: 0.82 K/UL (ref 1–4.8)
LYMPHOCYTES NFR BLD: 13.5 % (ref 22–41)
MCH RBC QN AUTO: 28.2 PG (ref 27–33)
MCHC RBC AUTO-ENTMCNC: 31.4 G/DL (ref 33.7–35.3)
MCV RBC AUTO: 89.6 FL (ref 81.4–97.8)
MONOCYTES # BLD AUTO: 0.56 K/UL (ref 0–0.85)
MONOCYTES NFR BLD AUTO: 9.2 % (ref 0–13.4)
NEUTROPHILS # BLD AUTO: 4.25 K/UL (ref 1.82–7.42)
NEUTROPHILS NFR BLD: 70.2 % (ref 44–72)
NRBC # BLD AUTO: 0 K/UL
NRBC BLD-RTO: 0 /100 WBC
PLATELET # BLD AUTO: 204 K/UL (ref 164–446)
PMV BLD AUTO: 10.1 FL (ref 9–12.9)
POTASSIUM SERPL-SCNC: 3.9 MMOL/L (ref 3.6–5.5)
PROT SERPL-MCNC: 7 G/DL (ref 6–8.2)
RBC # BLD AUTO: 5.47 M/UL (ref 4.7–6.1)
SODIUM SERPL-SCNC: 138 MMOL/L (ref 135–145)
WBC # BLD AUTO: 6.1 K/UL (ref 4.8–10.8)

## 2021-01-12 PROCEDURE — 85025 COMPLETE CBC W/AUTO DIFF WBC: CPT

## 2021-01-12 PROCEDURE — 700111 HCHG RX REV CODE 636 W/ 250 OVERRIDE (IP): Performed by: STUDENT IN AN ORGANIZED HEALTH CARE EDUCATION/TRAINING PROGRAM

## 2021-01-12 PROCEDURE — 80053 COMPREHEN METABOLIC PANEL: CPT

## 2021-01-12 PROCEDURE — 99239 HOSP IP/OBS DSCHRG MGMT >30: CPT | Performed by: INTERNAL MEDICINE

## 2021-01-12 RX ADMIN — ENOXAPARIN SODIUM 100 MG: 100 INJECTION SUBCUTANEOUS at 06:26

## 2021-01-12 ASSESSMENT — PAIN DESCRIPTION - PAIN TYPE: TYPE: ACUTE PAIN

## 2021-01-12 NOTE — CARE PLAN
Problem: Safety  Goal: Will remain free from injury  Outcome: PROGRESSING AS EXPECTED  Goal: Will remain free from falls  Outcome: PROGRESSING AS EXPECTED   Updated about POC. Reinforce call light use. Pt acknowledged understanding    Problem: Venous Thromboembolism (VTW)/Deep Vein Thrombosis (DVT) Prevention:  Goal: Patient will participate in Venous Thrombosis (VTE)/Deep Vein Thrombosis (DVT)Prevention Measures  Outcome: PROGRESSING AS EXPECTED  Lovenox BID     Problem: Bowel/Gastric:  Goal: Normal bowel function is maintained or improved  Outcome: PROGRESSING AS EXPECTED  Goal: Will not experience complications related to bowel motility  Outcome: PROGRESSING AS EXPECTED  Denies n/v. Ng tube dcd

## 2021-01-12 NOTE — PROGRESS NOTES
Assumed care at 1845. Pt resting in bed. A&ox 4  Ambulating independently  abd soft. +BM. Denies n/v  O2 on RA  Denies diff swallowing. Denies pain  Voiding adequately  Tolerating clears.   Call light within reach. Hourly rounding in place

## 2021-01-12 NOTE — PROGRESS NOTES
Bedside report received. Assessment completed.  Pt is A&O x4. Pt on room air.   Medicating for pain PRN per MAR Pain 0/10  Denies nausea.   - numbness, - tingling.  5 healed abd lapsites    Last BM 1/12/21 . +flatus,   +void.  Clear liquid diet. Tolerates well.   Pt up self.  Call light and belongings within reach. All needs met at this time. Fall Precautions and hourly rounding in place.

## 2021-01-12 NOTE — DISCHARGE INSTRUCTIONS
Discharge Instructions    Discharged to home by car with relative. Discharged via wheelchair, hospital escort: Yes.  Special equipment needed: Not Applicable    Be sure to schedule a follow-up appointment with your primary care doctor or any specialists as instructed.     Discharge Plan:   Diet Plan: Discussed  Activity Level: Discussed  Confirmed Follow up Appointment: Patient to Call and Schedule Appointment  Confirmed Symptoms Management: Discussed  Medication Reconciliation Updated: Yes  Influenza Vaccine Indication: Not indicated: Previously immunized this influenza season and > 8 years of age    I understand that a diet low in cholesterol, fat, and sodium is recommended for good health. Unless I have been given specific instructions below for another diet, I accept this instruction as my diet prescription.   Other diet: Regular    Special Instructions: None    · Is patient discharged on Warfarin / Coumadin?   No     Depression / Suicide Risk    As you are discharged from this [facility] facility, it is important to learn how to keep safe from harming yourself.    Recognize the warning signs:  · Abrupt changes in personality, positive or negative- including increase in energy   · Giving away possessions  · Change in eating patterns- significant weight changes-  positive or negative  · Change in sleeping patterns- unable to sleep or sleeping all the time   · Unwillingness or inability to communicate  · Depression  · Unusual sadness, discouragement and loneliness  · Talk of wanting to die  · Neglect of personal appearance   · Rebelliousness- reckless behavior  · Withdrawal from people/activities they love  · Confusion- inability to concentrate     If you or a loved one observes any of these behaviors or has concerns about self-harm, here's what you can do:  · Talk about it- your feelings and reasons for harming yourself  · Remove any means that you might use to hurt yourself (examples: pills, rope, extension  "cords, firearm)  · Get professional help from the community (Mental Health, Substance Abuse, psychological counseling)  · Do not be alone:Call your Safe Contact- someone whom you trust who will be there for you.  · Call your local CRISIS HOTLINE 017-1377 or 622-174-0619  · Call your local Children's Mobile Crisis Response Team [place] (780) 145-2796 or www.Assurz  · Call the toll free National Suicide Prevention Hotlines   · National Suicide Prevention Lifeline 471-741-DARI (8495)  · [employer] Line Network 800-SUICIDE (093-1890)      Intestinal Obstruction  An intestinal obstruction is a blockage of the intestine. It can be caused by a physical blockage or by a problem of abnormal function of the intestine.   CAUSES   · Adhesions from previous surgeries.  · Cancer or tumor.  · A hernia, which is a condition in which a portion of the bowel bulges out through an opening or weakness in the abdomen. This sometimes squeezes the bowel.  · A swallowed object.  · Blockage (impaction) with worms is common in third world countries.  · A twisting of the bowel or telescoping of a portion of the bowel into another portion (intussusceptions).  · Anything that stops food from going through from the stomach to the anus.  SYMPTOMS   Symptoms of bowel obstruction may include abdominal bloating, nausea, vomiting, explosive diarrhea, or explosive stool. You may not be able to hear your normal bowel sounds (such as \"growling in your stomach\"). You may also stop having bowel movements or passing gas.  DIAGNOSIS   Usually this condition is diagnosed with a history and an examination. Often, lab studies (blood work) and X-rays may be used to find the cause.  TREATMENT   The main treatment for this condition is to rest the intestine. Often, the obstruction may relieve itself and allow the intestine to start working again. Think of the intestine like a balloon that is blown up (filled with trapped food and water that has " squeezed into a hole or area that it cannot get through).   · If the obstruction is complete, a nasogastric (NG) tube is passed through the nose and into the stomach. It is then connected to suction to keep the stomach emptied out. This also helps treat the nausea and vomiting.  · If there is an imbalance in the electrolytes, they are corrected with intravenous fluids. These fluids have the proper chemicals in them to correct the problem.  · If the reason for the blockage does not get better with conservative (nonsurgical) treatment, surgery may be necessary. Sometimes, surgery is done immediately if your surgeon knows that the problem is not going to get better with conservative treatment.  PROGNOSIS   Depending on what the problem is, most of these problems can be treated by your caregivers with good results. Your caregiver will discuss with you the best course of action to take.  FOLLOWING SURGERY  Seek immediate medical attention if you have:  · Increasing abdominal pain, repeated vomiting, dehydration, or fainting.  · Severe weakness, chest pain, or back pain.  · Blood in your vomit or stool.  · Tarry stool.  Document Released: 03/09/2005 Document Revised: 04/14/2014 Document Reviewed: 08/07/2009  ExitCare® Patient Information ©2014 [employer], [employer].    Intestinal Obstruction  Care After  Please read the instructions outlined below. Refer to these instructions for the next few weeks. These discharge instructions provide you with general information on caring for yourself after surgery. Your caregiver may also give you specific instructions. While your treatment has been planned according to the most current medical practices available, unavoidable complications occasionally occur. If you have any problems or questions after discharge, please call your caregiver.  HOME CARE INSTRUCTIONS   · It is normal to be sore for a couple weeks following surgery. See your caregiver if this seems to be getting worse rather than  better.  · Take prescribed medicine as directed. Only take over-the-counter or prescription medicines for pain, discomfort, and or fever, as directed by your caregiver.  · Use showers for bathing, until seen or as instructed.  · Change dressings if necessary or as directed.  · You may resume normal diet and activities as directed or allowed.  · Liquids and soft foods may be easier to digest at first. Once you can tolerate liquid and soft foods, you can begin eating regular solid foods.  · Avoid lifting or driving until you are instructed otherwise.  · Make an appointment to see your caregiver for suture (stitches) or staple removal when instructed.  · You may use ice on your incision for 15-20 minutes, 3-4 times per day for the first two days.  SEEK MEDICAL CARE IF:   · You see redness, swelling, or have increasing pain in wounds.  · You have pus coming from your wound.  · You develop an unexplained temperature over 102° F (38.9° C).  · You have a bad smell coming from the wound or dressing.  · You develop lightheadedness or feel faint.  SEEK IMMEDIATE MEDICAL CARE IF:   · You have increased bleeding from wounds.  · You have increasing abdominal pain, repeated vomiting, dehydration or fainting.  · You develop severe weakness, chest pain or back pain  · You develop blood in your vomit, stool or you have tarry stool.  · You develop a rash.  · You have difficulty breathing.  · You develop any reaction or side effects to medicines given.  Document Released: 07/07/2006 Document Revised: 03/11/2013 Document Reviewed: 01/20/2009  [employer]® Patient Information ©2014 [employer].

## 2021-01-12 NOTE — DISCHARGE SUMMARY
Discharge Summary    CHIEF COMPLAINT ON ADMISSION  Chief Complaint   Patient presents with   • Abdominal Pain     started today, recent hernia surgery 12/15, pt diagnosed with SBO in outside facility       Reason for Admission  SBO     Admission Date  1/10/2021    CODE STATUS  Full Code    HPI & HOSPITAL COURSE  This is a 40 y.o. male here with history of recent hernia surgery came in with SBO from outside facility. He was found to have a history of DVT and his outpatient eliquis was held on admission for possible surgical intervention. He had mild pancreatitis as well and was made NPO with aggressive IV fluids. General surgery was consulted. NG decompression was performed and patient's GI symptoms began to improve with an additional conservative treatment. His pancreatitis resolved and patient's RUQ ultrasound did not show any evidence of gallstones. NG tube was removed and patient had transient dysphagia that self resolved. He was able to tolerate oral intake without issue and ready for medical discharge. He can resume his outpatient eliquis now.    No notes on file    Therefore, he is discharged in fair and stable condition to home with close outpatient follow-up.    The patient met 2-midnight criteria for an inpatient stay at the time of discharge.    Discharge Date  1/12/2021    FOLLOW UP ITEMS POST DISCHARGE  F/'U with PCP in 1-2 weeks  F/U with Dr Gansert office in 1 week    DISCHARGE DIAGNOSES  Principal Problem (Resolved):    SBO (small bowel obstruction) (HCC) POA: Yes  Active Problems:    DVT (deep venous thrombosis) (HCC) POA: Yes  Resolved Problems:    Pancreatitis POA: Yes    Hyperglycemia POA: Yes      FOLLOW UP  No future appointments.  No follow-up provider specified.    MEDICATIONS ON DISCHARGE     Medication List      CONTINUE taking these medications      Instructions   Eliquis 5mg Tabs  Generic drug: apixaban   Take 5 mg by mouth 2 Times a Day.  Dose: 5 mg     ferrous sulfate 325 (65 Fe) MG  tablet   Take 325 mg by mouth 2 Times a Day.  Dose: 325 mg     VITAMIN C PO   Take 1 Tab by mouth 2 Times a Day.  Dose: 1 Tab            Allergies  No Known Allergies    DIET  Orders Placed This Encounter   Procedures   • Diet Order Diet: Clear Liquid     Standing Status:   Standing     Number of Occurrences:   1     Order Specific Question:   Diet:     Answer:   Clear Liquid [10]       ACTIVITY  As tolerated.  Weight bearing as tolerated    CONSULTATIONS  General surgery    PROCEDURES  US-RUQ   Final Result      No peripancreatic fluid collections. Pancreas is poorly visualized, but appears somewhat poorly defined.      DX-ABDOMEN FOR TUBE PLACEMENT   Final Result      NG tube terminates over the stomach.      OUTSIDE IMAGES-CT ABDOMEN /PELVIS   Final Result      OUTSIDE IMAGES-DX CHEST   Final Result            LABORATORY  Lab Results   Component Value Date    SODIUM 138 01/12/2021    POTASSIUM 3.9 01/12/2021    CHLORIDE 100 01/12/2021    CO2 26 01/12/2021    GLUCOSE 98 01/12/2021    BUN 6 (L) 01/12/2021    CREATININE 0.79 01/12/2021        Lab Results   Component Value Date    WBC 6.1 01/12/2021    HEMOGLOBIN 15.4 01/12/2021    HEMATOCRIT 49.0 01/12/2021    PLATELETCT 204 01/12/2021        Total time of the discharge process exceeds 32 minutes.

## 2021-01-12 NOTE — PROGRESS NOTES
Chris Enamorado was at [facility] from 1/10/2021 and discharged 1/12/2021. He may return to work on 1/16/2021.

## 2023-01-18 NOTE — ANESTHESIA QCDR

## (undated) DEVICE — ENDOSTITCH LOAD UNIT 0 SURGI - 12/CA

## (undated) DEVICE — SET EXTENSION WITH 2 PORTS (48EA/CA) ***PART #2C8610 IS A SUBSTITUTE*****

## (undated) DEVICE — SODIUM CHL IRRIGATION 0.9% 1000ML (12EA/CA)

## (undated) DEVICE — MASK ANESTHESIA ADULT  - (100/CA)

## (undated) DEVICE — CANNULA W/SEAL 5X100 Z-THRE - ADED KII (12/BX)

## (undated) DEVICE — DRESSING TRANSPARENT FILM TEGADERM 2.375 X 2.75"  (100EA/BX)"

## (undated) DEVICE — SYRINGE 50ML CATHETER TIP (40EA/BX 4BX/CA)

## (undated) DEVICE — SET TUBING PNEUMOCLEAR HIGH FLOW SMOKE EVACUATION (10EA/BX)

## (undated) DEVICE — GLOVE BIOGEL SZ 7.5 SURGICAL PF LTX - (50PR/BX 4BX/CA)

## (undated) DEVICE — GLOVE BIOGEL M SZ 8 SURGICAL PF LTX - (50/BX 4BX/CA)

## (undated) DEVICE — HEAD HOLDER JUNIOR/ADULT

## (undated) DEVICE — SLEEVE, VASO, THIGH, MED

## (undated) DEVICE — CANISTER SUCTION 3000ML MECHANICAL FILTER AUTO SHUTOFF MEDI-VAC NONSTERILE LF DISP  (40EA/CA)

## (undated) DEVICE — ENDOSTITCH10MM SUTURING DEVIC - (3/CA)

## (undated) DEVICE — SEALER VESSEL HARMONIC ACE PLUS WITH ADVANCED HEMOSTASIS 36CM (1/EA)

## (undated) DEVICE — TROCAR Z THREAD11MM OPTICAL - NON BLADED(6/BX)

## (undated) DEVICE — SPONGE GAUZESTER. 2X2 4-PL - (2/PK 50PK/BX 30BX/CS)

## (undated) DEVICE — POUCH 750ML TISSUE 5 X 8

## (undated) DEVICE — SENSOR SPO2 NEO LNCS ADHESIVE (20/BX) SEE USER NOTES

## (undated) DEVICE — SUCTION INSTRUMENT YANKAUER BULBOUS TIP W/O VENT (50EA/CA)

## (undated) DEVICE — SET LEADWIRE 5 LEAD BEDSIDE DISPOSABLE ECG (1SET OF 5/EA)

## (undated) DEVICE — CHLORAPREP 26 ML APPLICATOR - ORANGE TINT(25/CA)

## (undated) DEVICE — BAG RETRIEVAL 10ML (10EA/BX)

## (undated) DEVICE — PACK LAP CHOLE OR - (2EA/CA)

## (undated) DEVICE — TUBING LAPAROSCOPIC PLUME DEVICE (10EA/CA)

## (undated) DEVICE — KIT ANESTHESIA W/CIRCUIT & 3/LT BAG W/FILTER (20EA/CA)

## (undated) DEVICE — TROCAR 5X100 NON BLADED Z-TH - READ KII (6/BX)

## (undated) DEVICE — SUTURE 4-0 VICRYL PLUS FS-2 - 27 INCH (36/BX)

## (undated) DEVICE — NEPTUNE 4 PORT MANIFOLD - (20/PK)

## (undated) DEVICE — ELECTRODE DUAL RETURN W/ CORD - (50/PK)

## (undated) DEVICE — NEEDLE INSFL 120MM 14GA VRRS - (20/BX)

## (undated) DEVICE — LACTATED RINGERS INJ 1000 ML - (14EA/CA 60CA/PF)

## (undated) DEVICE — GOWN WARMING STANDARD FLEX - (30/CA)

## (undated) DEVICE — TUBING CLEARLINK DUO-VENT - C-FLO (48EA/CA)

## (undated) DEVICE — ELECTRODE 850 FOAM ADHESIVE - HYDROGEL RADIOTRNSPRNT (50/PK)

## (undated) DEVICE — SUTURE GENERAL

## (undated) DEVICE — SET SUCTION/IRRIGATION WITH DISPOSABLE TIP (6/CA )PART #0250-070-520 IS A SUB

## (undated) DEVICE — TUBE NG SALEM SUMP 16FR (50EA/CA)

## (undated) DEVICE — GLOVE BIOGEL INDICATOR SZ 8 SURGICAL PF LTX - (50/BX 4BX/CA)

## (undated) DEVICE — PROTECTOR ULNA NERVE - (36PR/CA)

## (undated) DEVICE — GOWN SURGEONS X-LARGE - DISP. (30/CA)

## (undated) DEVICE — SUTURE 0 VICRYL PLUS CT-2 - 27 INCH (36/BX)